# Patient Record
Sex: MALE | Race: WHITE | Employment: OTHER | URBAN - METROPOLITAN AREA
[De-identification: names, ages, dates, MRNs, and addresses within clinical notes are randomized per-mention and may not be internally consistent; named-entity substitution may affect disease eponyms.]

---

## 2021-10-15 ENCOUNTER — APPOINTMENT (OUTPATIENT)
Dept: MRI IMAGING | Age: 66
DRG: 645 | End: 2021-10-15
Attending: HOSPITALIST
Payer: MEDICARE

## 2021-10-15 ENCOUNTER — APPOINTMENT (OUTPATIENT)
Dept: MRI IMAGING | Age: 66
DRG: 645 | End: 2021-10-15
Attending: EMERGENCY MEDICINE
Payer: MEDICARE

## 2021-10-15 ENCOUNTER — APPOINTMENT (OUTPATIENT)
Dept: GENERAL RADIOLOGY | Age: 66
DRG: 645 | End: 2021-10-15
Attending: EMERGENCY MEDICINE
Payer: MEDICARE

## 2021-10-15 ENCOUNTER — APPOINTMENT (OUTPATIENT)
Dept: CT IMAGING | Age: 66
DRG: 645 | End: 2021-10-15
Attending: EMERGENCY MEDICINE
Payer: MEDICARE

## 2021-10-15 ENCOUNTER — HOSPITAL ENCOUNTER (INPATIENT)
Age: 66
LOS: 3 days | Discharge: NURSING, REHAB OR LONG-TERM CARE | DRG: 645 | End: 2021-10-18
Attending: EMERGENCY MEDICINE | Admitting: HOSPITALIST
Payer: MEDICARE

## 2021-10-15 DIAGNOSIS — M47.27 LUMBOSACRAL RADICULOPATHY DUE TO DEGENERATIVE JOINT DISEASE OF SPINE: ICD-10-CM

## 2021-10-15 DIAGNOSIS — R53.1 GENERALIZED WEAKNESS: ICD-10-CM

## 2021-10-15 DIAGNOSIS — E88.9 METABOLIC MYOPATHY: ICD-10-CM

## 2021-10-15 DIAGNOSIS — R53.1 WEAKNESS GENERALIZED: ICD-10-CM

## 2021-10-15 DIAGNOSIS — Z86.69 HISTORY OF TOXIC ENCEPHALOPATHY: ICD-10-CM

## 2021-10-15 DIAGNOSIS — G73.7 METABOLIC MYOPATHY: ICD-10-CM

## 2021-10-15 DIAGNOSIS — R53.1 WEAKNESS: ICD-10-CM

## 2021-10-15 DIAGNOSIS — R26.2 AMBULATORY DYSFUNCTION: Primary | ICD-10-CM

## 2021-10-15 DIAGNOSIS — R25.1 TREMORS OF NERVOUS SYSTEM: ICD-10-CM

## 2021-10-15 DIAGNOSIS — M47.22 CERVICAL RADICULOPATHY DUE TO DEGENERATIVE JOINT DISEASE OF SPINE: ICD-10-CM

## 2021-10-15 PROBLEM — E87.1 HYPONATREMIA: Status: ACTIVE | Noted: 2021-10-15

## 2021-10-15 LAB
ALBUMIN SERPL-MCNC: 3.8 G/DL (ref 3.5–5)
ALBUMIN/GLOB SERPL: 1 {RATIO} (ref 1.1–2.2)
ALP SERPL-CCNC: 49 U/L (ref 45–117)
ALT SERPL-CCNC: 32 U/L (ref 12–78)
AMMONIA PLAS-SCNC: 24 UMOL/L
AMPHET UR QL SCN: NEGATIVE
ANION GAP SERPL CALC-SCNC: 3 MMOL/L (ref 5–15)
APPEARANCE UR: CLEAR
AST SERPL-CCNC: 14 U/L (ref 15–37)
ATRIAL RATE: 78 BPM
BACTERIA URNS QL MICRO: NEGATIVE /HPF
BARBITURATES UR QL SCN: NEGATIVE
BASOPHILS # BLD: 0 K/UL (ref 0–0.1)
BASOPHILS NFR BLD: 0 % (ref 0–1)
BENZODIAZ UR QL: NEGATIVE
BILIRUB SERPL-MCNC: 0.7 MG/DL (ref 0.2–1)
BILIRUB UR QL: NEGATIVE
BUN SERPL-MCNC: 16 MG/DL (ref 6–20)
BUN/CREAT SERPL: 19 (ref 12–20)
CALCIUM SERPL-MCNC: 9.6 MG/DL (ref 8.5–10.1)
CALCULATED P AXIS, ECG09: 47 DEGREES
CALCULATED R AXIS, ECG10: 1 DEGREES
CALCULATED T AXIS, ECG11: 34 DEGREES
CANNABINOIDS UR QL SCN: NEGATIVE
CHLORIDE SERPL-SCNC: 100 MMOL/L (ref 97–108)
CK SERPL-CCNC: 78 U/L (ref 39–308)
CO2 SERPL-SCNC: 28 MMOL/L (ref 21–32)
COCAINE UR QL SCN: NEGATIVE
COLOR UR: NORMAL
COMMENT, HOLDF: NORMAL
CREAT SERPL-MCNC: 0.85 MG/DL (ref 0.7–1.3)
DIAGNOSIS, 93000: NORMAL
DIFFERENTIAL METHOD BLD: NORMAL
DRUG SCRN COMMENT,DRGCM: NORMAL
EOSINOPHIL # BLD: 0 K/UL (ref 0–0.4)
EOSINOPHIL NFR BLD: 1 % (ref 0–7)
EPITH CASTS URNS QL MICRO: NORMAL /LPF
ERYTHROCYTE [DISTWIDTH] IN BLOOD BY AUTOMATED COUNT: 13.9 % (ref 11.5–14.5)
ETHANOL SERPL-MCNC: <10 MG/DL
GLOBULIN SER CALC-MCNC: 4 G/DL (ref 2–4)
GLUCOSE SERPL-MCNC: 113 MG/DL (ref 65–100)
GLUCOSE UR STRIP.AUTO-MCNC: NEGATIVE MG/DL
HCT VFR BLD AUTO: 41 % (ref 36.6–50.3)
HGB BLD-MCNC: 14.3 G/DL (ref 12.1–17)
HGB UR QL STRIP: NEGATIVE
IMM GRANULOCYTES # BLD AUTO: 0 K/UL (ref 0–0.04)
IMM GRANULOCYTES NFR BLD AUTO: 0 % (ref 0–0.5)
KETONES UR QL STRIP.AUTO: NEGATIVE MG/DL
LEUKOCYTE ESTERASE UR QL STRIP.AUTO: NEGATIVE
LYMPHOCYTES # BLD: 1.5 K/UL (ref 0.8–3.5)
LYMPHOCYTES NFR BLD: 21 % (ref 12–49)
MAGNESIUM SERPL-MCNC: 2.5 MG/DL (ref 1.6–2.4)
MCH RBC QN AUTO: 30.2 PG (ref 26–34)
MCHC RBC AUTO-ENTMCNC: 34.9 G/DL (ref 30–36.5)
MCV RBC AUTO: 86.5 FL (ref 80–99)
METHADONE UR QL: NEGATIVE
MONOCYTES # BLD: 0.7 K/UL (ref 0–1)
MONOCYTES NFR BLD: 10 % (ref 5–13)
NEUTS SEG # BLD: 4.9 K/UL (ref 1.8–8)
NEUTS SEG NFR BLD: 68 % (ref 32–75)
NITRITE UR QL STRIP.AUTO: NEGATIVE
NRBC # BLD: 0 K/UL (ref 0–0.01)
NRBC BLD-RTO: 0 PER 100 WBC
OPIATES UR QL: NEGATIVE
OSMOLALITY SERPL: 275 MOSM/KG H2O
OSMOLALITY UR: 586 MOSM/KG H2O
P-R INTERVAL, ECG05: 138 MS
PCP UR QL: NEGATIVE
PH UR STRIP: 7.5 [PH] (ref 5–8)
PLATELET # BLD AUTO: 253 K/UL (ref 150–400)
PMV BLD AUTO: 11.4 FL (ref 8.9–12.9)
POTASSIUM SERPL-SCNC: 3.9 MMOL/L (ref 3.5–5.1)
PROT SERPL-MCNC: 7.8 G/DL (ref 6.4–8.2)
PROT UR STRIP-MCNC: NEGATIVE MG/DL
Q-T INTERVAL, ECG07: 378 MS
QRS DURATION, ECG06: 90 MS
QTC CALCULATION (BEZET), ECG08: 430 MS
RBC # BLD AUTO: 4.74 M/UL (ref 4.1–5.7)
RBC #/AREA URNS HPF: NORMAL /HPF (ref 0–5)
SAMPLES BEING HELD,HOLD: NORMAL
SODIUM SERPL-SCNC: 131 MMOL/L (ref 136–145)
SODIUM UR-SCNC: 144 MMOL/L
SP GR UR REFRACTOMETRY: 1.02 (ref 1–1.03)
TSH SERPL DL<=0.05 MIU/L-ACNC: 2.9 UIU/ML (ref 0.36–3.74)
UA: UC IF INDICATED,UAUC: NORMAL
UROBILINOGEN UR QL STRIP.AUTO: 0.2 EU/DL (ref 0.2–1)
VENTRICULAR RATE, ECG03: 78 BPM
WBC # BLD AUTO: 7.2 K/UL (ref 4.1–11.1)
WBC URNS QL MICRO: NORMAL /HPF (ref 0–4)

## 2021-10-15 PROCEDURE — 83935 ASSAY OF URINE OSMOLALITY: CPT

## 2021-10-15 PROCEDURE — 99285 EMERGENCY DEPT VISIT HI MDM: CPT

## 2021-10-15 PROCEDURE — 80307 DRUG TEST PRSMV CHEM ANLYZR: CPT

## 2021-10-15 PROCEDURE — 80053 COMPREHEN METABOLIC PANEL: CPT

## 2021-10-15 PROCEDURE — 82077 ASSAY SPEC XCP UR&BREATH IA: CPT

## 2021-10-15 PROCEDURE — 85025 COMPLETE CBC W/AUTO DIFF WBC: CPT

## 2021-10-15 PROCEDURE — 83735 ASSAY OF MAGNESIUM: CPT

## 2021-10-15 PROCEDURE — A9576 INJ PROHANCE MULTIPACK: HCPCS | Performed by: HOSPITALIST

## 2021-10-15 PROCEDURE — 84443 ASSAY THYROID STIM HORMONE: CPT

## 2021-10-15 PROCEDURE — 82550 ASSAY OF CK (CPK): CPT

## 2021-10-15 PROCEDURE — 82140 ASSAY OF AMMONIA: CPT

## 2021-10-15 PROCEDURE — 81001 URINALYSIS AUTO W/SCOPE: CPT

## 2021-10-15 PROCEDURE — 70450 CT HEAD/BRAIN W/O DYE: CPT

## 2021-10-15 PROCEDURE — 74011250637 HC RX REV CODE- 250/637: Performed by: HOSPITALIST

## 2021-10-15 PROCEDURE — 72141 MRI NECK SPINE W/O DYE: CPT

## 2021-10-15 PROCEDURE — 71045 X-RAY EXAM CHEST 1 VIEW: CPT

## 2021-10-15 PROCEDURE — 70553 MRI BRAIN STEM W/O & W/DYE: CPT

## 2021-10-15 PROCEDURE — 65660000000 HC RM CCU STEPDOWN

## 2021-10-15 PROCEDURE — 82607 VITAMIN B-12: CPT

## 2021-10-15 PROCEDURE — 84300 ASSAY OF URINE SODIUM: CPT

## 2021-10-15 PROCEDURE — 36415 COLL VENOUS BLD VENIPUNCTURE: CPT

## 2021-10-15 PROCEDURE — 74011250636 HC RX REV CODE- 250/636: Performed by: HOSPITALIST

## 2021-10-15 PROCEDURE — 83930 ASSAY OF BLOOD OSMOLALITY: CPT

## 2021-10-15 PROCEDURE — 72158 MRI LUMBAR SPINE W/O & W/DYE: CPT

## 2021-10-15 PROCEDURE — 93005 ELECTROCARDIOGRAM TRACING: CPT

## 2021-10-15 RX ORDER — ENOXAPARIN SODIUM 100 MG/ML
40 INJECTION SUBCUTANEOUS EVERY 24 HOURS
Status: DISCONTINUED | OUTPATIENT
Start: 2021-10-15 | End: 2021-10-18 | Stop reason: HOSPADM

## 2021-10-15 RX ORDER — ACETAMINOPHEN 650 MG/1
650 SUPPOSITORY RECTAL
Status: DISCONTINUED | OUTPATIENT
Start: 2021-10-15 | End: 2021-10-15

## 2021-10-15 RX ORDER — ACETAMINOPHEN 325 MG/1
650 TABLET ORAL
Status: DISCONTINUED | OUTPATIENT
Start: 2021-10-15 | End: 2021-10-18 | Stop reason: HOSPADM

## 2021-10-15 RX ORDER — ACETAMINOPHEN 325 MG/1
650 TABLET ORAL
Status: DISCONTINUED | OUTPATIENT
Start: 2021-10-15 | End: 2021-10-15

## 2021-10-15 RX ORDER — ACETAMINOPHEN 650 MG/1
650 SUPPOSITORY RECTAL
Status: DISCONTINUED | OUTPATIENT
Start: 2021-10-15 | End: 2021-10-18 | Stop reason: HOSPADM

## 2021-10-15 RX ORDER — LORAZEPAM 2 MG/ML
1 INJECTION INTRAMUSCULAR
Status: DISCONTINUED | OUTPATIENT
Start: 2021-10-15 | End: 2021-10-18 | Stop reason: HOSPADM

## 2021-10-15 RX ORDER — LORAZEPAM 2 MG/ML
1 INJECTION INTRAMUSCULAR ONCE
Status: COMPLETED | OUTPATIENT
Start: 2021-10-15 | End: 2021-10-15

## 2021-10-15 RX ADMIN — ACETAMINOPHEN 650 MG: 325 TABLET ORAL at 20:01

## 2021-10-15 RX ADMIN — GADOTERIDOL 13 ML: 279.3 INJECTION, SOLUTION INTRAVENOUS at 15:44

## 2021-10-15 RX ADMIN — LORAZEPAM 1 MG: 2 INJECTION INTRAMUSCULAR; INTRAVENOUS at 14:37

## 2021-10-15 RX ADMIN — ENOXAPARIN SODIUM 40 MG: 40 INJECTION SUBCUTANEOUS at 16:59

## 2021-10-15 NOTE — ED NOTES
Patient is being transferred to [facility] 3 Neuroscience ICU, Room # [phone]. Report given to [facility] RN on Cecil Dickerson for routine progression of care. Report consisted of the following information SBAR and MAR. Patient transferred to receiving unit by: transport (RN or tech name). Outstanding consults needed: No     Next labs due: No     The following personal items will be sent with the patient during transfer to the floor: All valuables:    Cardiac monitoring ordered: No     The following CURRENT information was reported to the receiving RN:    Code status: Full Code at time of transfer    Last set of vital signs:  Vital Signs  Level of Consciousness: Alert (0) (10/15/21 1315)  Temp: 98.7 °F (37.1 °C) (10/15/21 1315)  Temp Source: Oral (10/15/21 1315)  Pulse (Heart Rate): 83 (10/15/21 1315)  Resp Rate: 19 (10/15/21 1315)  BP: 112/66 (10/15/21 1315)  MAP (Monitor): 89 (10/15/21 1115)  MAP (Calculated): 81 (10/15/21 1315)  MEWS Score: 1 (10/15/21 1315)         Oxygen Therapy  O2 Sat (%): 97 % (10/15/21 1315)  Pulse via Oximetry: 85 beats per minute (10/15/21 1315)  O2 Device: None (Room air) (10/15/21 1315)      Last pain assessment:         Wounds: No     Urinary catheter: voiding  Is there a pena order: No     LDAs:       Peripheral IV 10/15/21 Right Antecubital (Active)   Site Assessment Clean, dry, & intact 10/15/21 1519   Phlebitis Assessment 0 10/15/21 1519   Infiltration Assessment 0 10/15/21 1519   Dressing Status Clean, dry, & intact 10/15/21 1519   Hub Color/Line Status Pink 10/15/21 1519         Opportunity for questions and clarification was provided.     Ariana Armstrong RN

## 2021-10-15 NOTE — PROGRESS NOTES
-Please complete MRI History and Safety Screening Form   - Patient cannot be scanned until this form is completed and reviewed in MRI to ensure patient is SAFE and eligible for MRI. - CALL MRI when this has been successfully completed at [phone].

## 2021-10-15 NOTE — PROGRESS NOTES
Pharmacy Medication Reconciliation     The patient was interviewed regarding current PTA medication list, use and drug allergies;  patient/patient's wife present in room and obtained permission from patient to discuss drug regimen with visitor(s) present. The patient was questioned regarding use of any other inhalers, topical products, over the counter medications, herbal medications, vitamin products or ophthalmic/nasal/otic medication use. Allergy Update: Patient has no known allergies. Recommendations/Findings: The following amendments were made to the patient's active medication list on file at [address]:   1) Additions: none  2) Deletions: none  3) Changes: none  Pertinent Findings: none    Clarified PTA med list with patient/patient's wife interivew, rx query. PTA medication list was corrected to the following:     Confirmed no home meds.    None        Thank you,  Avie Councilman, [facility]

## 2021-10-15 NOTE — PROGRESS NOTES
End of Shift Note    Bedside shift change report given to Jessica [address]  (oncoming nurse) by MAXIMINO Brothers (offgoing nurse). Report included the following information SBAR, Kardex, ED Summary, Procedure Summary, Intake/Output, MAR, Accordion, Recent Results and Med Rec Status    Shift worked:  7a-7p   Shift summary and any significant changes:     admission, no acute events to report        Concerns for physician to address: none   Zone phone for oncoming shift:   none     Patient Information  Krystyna Serrano  61 y.o.  10/15/2021  6:29 AM by Mikell Dakins, MD. Krystyna Serrano was admitted from Home    Problem List  Patient Active Problem List    Diagnosis Date Noted    Hyponatremia 10/15/2021    Generalized weakness 10/15/2021    Unable to walk 10/15/2021     No past medical history on file. Core Measures:  CVA: No Yes  CHF:No No  PNA:No No    Activity:  Activity Level: Up with Assistance  Number times ambulated in hallways past shift: 1  Number of times OOB to chair past shift: 0    Cardiac:   Cardiac Monitoring: Yes           Access:   Current line(s): PIV       Genitourinary:   Urinary status: voiding      Respiratory:   O2 Device: None (Room air)  Chronic home O2 use?: NO  Incentive spirometer at bedside: NO       GI:     Current diet:  ADULT DIET Regular  Passing flatus: YES  Tolerating current diet: YES       Pain Management:   Patient states pain is manageable on current regimen: YES    Skin:  Jonathan Score: 18  Interventions: increase time out of bed    Patient Safety:  Fall Score:  Total Score: 0  Interventions: bed/chair alarm, assistive device (walker, cane, etc), gripper socks, pt to call before getting OOB and stay with me (per policy)     @Rollbelt  @dexterity to release roll belt  Yes/No ( must document dexterity  here by stating Yes or No here, otherwise this is a restraint and must follow restraint documentation policy.)    DVT prophylaxis:  DVT prophylaxis Med- Yes  DVT prophylaxis SCD or YISSEL- No Wounds: (If Applicable)  Wounds- No  Location     Active Consults:  IP CONSULT TO NEUROLOGY    Length of Stay:  Expected LOS: - - -  Actual LOS: 0  Discharge Plan: No TBMAXIMINO Loomis

## 2021-10-15 NOTE — ED NOTES
Verbal shift change report given to Jay Rai Lady (oncoming nurse) by Anoop Dunn (offgoing nurse). Report included the following information SBAR, ED Summary, Intake/Output and MAR.

## 2021-10-15 NOTE — ED PROVIDER NOTES
EMERGENCY DEPARTMENT HISTORY AND PHYSICAL EXAM      Date: 10/15/2021  Patient Name: Gennaro Cooley    History of Presenting Illness     Chief complaint: Weakness    History Provided By: Patient and Patient's Wife    HPI: Gennaro Cooley, 61 y.o. male without significant medical history presents to the ED with cc of tremors, generalized weakness, fatigue. Symptoms have been progressive worsening over the past few months. This past evening he had tremors throughout all extremities which progressed into a convulsive episode around 3 AM.  He was awake and alert throughout this entire episode and did not have any loss of consciousness or urinary incontinence. No prior history of seizures. Endorses worsening generalized weakness, fatigue, ambulatory dysfunction over the past few months but significantly worsened over the past few weeks and especially over the past few days he has no longer been able to care for himself or ambulate or take steps. He feels weak in all of his extremities without any focal deficits. Denies fevers, chills, or recent illness. He does not see a PCP or a neurologist.  He recently moved to the area from [facility] about a year and a half ago and endorses significant stress related to the move. Denies any prior medical history and does not take any medications. Denies any alcohol, drug use. Denies any depression or anxiety. Denies any injuries, fall, trauma. He has never seen a neurologist before. He believes that all of his symptoms may be related to a chemical exposure about 30 years ago to mercury while working at an old [employer] worksite. There are no other complaints, changes, or physical findings at this time. PCP: None    No current facility-administered medications on file prior to encounter. No current outpatient medications on file prior to encounter. Past History     Past Medical History:  No past medical history on file.     Past Surgical History:  No past surgical history on file. Family History:  Family History   Problem Relation Age of Onset   Zannie Cowden Arthritis-rheumatoid Father     Diabetes Brother        Social History:  Social History     Tobacco Use    Smoking status: Not on file   Substance Use Topics    Alcohol use: Not on file    Drug use: Not on file       Allergies:  No Known Allergies      Review of Systems   Review of Systems   Constitutional: Positive for activity change and fatigue. Negative for chills and fever. Eyes: Negative for visual disturbance. Respiratory: Negative for cough and shortness of breath. Cardiovascular: Negative for chest pain and leg swelling. Gastrointestinal: Negative for abdominal pain, nausea and vomiting. Musculoskeletal: Positive for gait problem. Negative for back pain. Skin: Negative for color change and rash. Neurological: Positive for weakness ( Generalized). Negative for dizziness, light-headedness and headaches. Hematological: Does not bruise/bleed easily. All other systems reviewed and are negative. Physical Exam   Physical Exam  Vitals and nursing note reviewed. Constitutional:       General: He is not in acute distress. Appearance: Normal appearance. He is not ill-appearing or toxic-appearing. Comments: Lying in bed in no acute distress, appears deconditioned. HENT:      Head: Normocephalic and atraumatic. Nose: Nose normal.      Mouth/Throat:      Mouth: Mucous membranes are moist.   Eyes:      Extraocular Movements: Extraocular movements intact. Pupils: Pupils are equal, round, and reactive to light. Cardiovascular:      Rate and Rhythm: Normal rate and regular rhythm. Heart sounds: No murmur heard. Pulmonary:      Effort: Pulmonary effort is normal. No respiratory distress. Breath sounds: Normal breath sounds. No wheezing. Abdominal:      General: There is no distension. Palpations: Abdomen is soft. Tenderness:  There is no abdominal tenderness. There is no guarding or rebound. Musculoskeletal:         General: No swelling or tenderness. Normal range of motion. Cervical back: Normal range of motion and neck supple. Right lower leg: No edema. Left lower leg: No edema. Comments: Muscle groups appear atrophied in all extremities. No injury, trauma, deformity. Skin:     General: Skin is warm and dry. Coloration: Skin is not pale. Findings: No erythema. Neurological:      General: No focal deficit present. Mental Status: He is alert and oriented to person, place, and time. Comments: Patient appears globally weak with 3/5 strength all extremities, sensation intact. No cerebellar deficits but he does have significant trembling in upper extremities. Very unsteady gait and unable to ambulate without assistance. No focal deficits. NIHSS 0. Significant hyperreflexia bilateral patellar DTRs         Diagnostic Study Results     Labs -     Recent Results (from the past 12 hour(s))   CBC WITH AUTOMATED DIFF    Collection Time: 10/15/21  7:28 AM   Result Value Ref Range    WBC 7.2 4.1 - 11.1 K/uL    RBC 4.74 4.10 - 5.70 M/uL    HGB 14.3 12.1 - 17.0 g/dL    HCT 41.0 36.6 - 50.3 %    MCV 86.5 80.0 - 99.0 FL    MCH 30.2 26.0 - 34.0 PG    MCHC 34.9 30.0 - 36.5 g/dL    RDW 13.9 11.5 - 14.5 %    PLATELET 622 299 - 126 K/uL    MPV 11.4 8.9 - 12.9 FL    NRBC 0.0 0  WBC    ABSOLUTE NRBC 0.00 0.00 - 0.01 K/uL    NEUTROPHILS 68 32 - 75 %    LYMPHOCYTES 21 12 - 49 %    MONOCYTES 10 5 - 13 %    EOSINOPHILS 1 0 - 7 %    BASOPHILS 0 0 - 1 %    IMMATURE GRANULOCYTES 0 0.0 - 0.5 %    ABS. NEUTROPHILS 4.9 1.8 - 8.0 K/UL    ABS. LYMPHOCYTES 1.5 0.8 - 3.5 K/UL    ABS. MONOCYTES 0.7 0.0 - 1.0 K/UL    ABS. EOSINOPHILS 0.0 0.0 - 0.4 K/UL    ABS. BASOPHILS 0.0 0.0 - 0.1 K/UL    ABS. IMM.  GRANS. 0.0 0.00 - 0.04 K/UL    DF AUTOMATED     METABOLIC PANEL, COMPREHENSIVE    Collection Time: 10/15/21  7:28 AM   Result Value Ref Range Sodium 131 (L) 136 - 145 mmol/L    Potassium 3.9 3.5 - 5.1 mmol/L    Chloride 100 97 - 108 mmol/L    CO2 28 21 - 32 mmol/L    Anion gap 3 (L) 5 - 15 mmol/L    Glucose 113 (H) 65 - 100 mg/dL    BUN 16 6 - 20 MG/DL    Creatinine 0.85 0.70 - 1.30 MG/DL    BUN/Creatinine ratio 19 12 - 20      GFR est AA >60 >60 ml/min/1.73m2    GFR est non-AA >60 >60 ml/min/1.73m2    Calcium 9.6 8.5 - 10.1 MG/DL    Bilirubin, total 0.7 0.2 - 1.0 MG/DL    ALT (SGPT) 32 12 - 78 U/L    AST (SGOT) 14 (L) 15 - 37 U/L    Alk. phosphatase 49 45 - 117 U/L    Protein, total 7.8 6.4 - 8.2 g/dL    Albumin 3.8 3.5 - 5.0 g/dL    Globulin 4.0 2.0 - 4.0 g/dL    A-G Ratio 1.0 (L) 1.1 - 2.2     SAMPLES BEING HELD    Collection Time: 10/15/21  7:28 AM   Result Value Ref Range    SAMPLES BEING HELD  2 RED, SST, BLUE     COMMENT        Add-on orders for these samples will be processed based on acceptable specimen integrity and analyte stability, which may vary by analyte.    ETHYL ALCOHOL    Collection Time: 10/15/21  7:28 AM   Result Value Ref Range    ALCOHOL(ETHYL),SERUM <10 <10 MG/DL   MAGNESIUM    Collection Time: 10/15/21  7:28 AM   Result Value Ref Range    Magnesium 2.5 (H) 1.6 - 2.4 mg/dL   TSH 3RD GENERATION    Collection Time: 10/15/21  7:28 AM   Result Value Ref Range    TSH 2.90 0.36 - 3.74 uIU/mL   EKG, 12 LEAD, INITIAL    Collection Time: 10/15/21  8:07 AM   Result Value Ref Range    Ventricular Rate 78 BPM    Atrial Rate 78 BPM    P-R Interval 138 ms    QRS Duration 90 ms    Q-T Interval 378 ms    QTC Calculation (Bezet) 430 ms    Calculated P Axis 47 degrees    Calculated R Axis 1 degrees    Calculated T Axis 34 degrees    Diagnosis       Normal sinus rhythm  Normal ECG  No previous ECGs available  Confirmed by Lucy Nunes ([id number]) on 10/15/2021 8:53:45 AM     DRUG SCREEN, URINE    Collection Time: 10/15/21  8:15 AM   Result Value Ref Range    AMPHETAMINES Negative NEG      BARBITURATES Negative NEG      BENZODIAZEPINES Negative NEG COCAINE Negative NEG      METHADONE Negative NEG      OPIATES Negative NEG      PCP(PHENCYCLIDINE) Negative NEG      THC (TH-CANNABINOL) Negative NEG      Drug screen comment (NOTE)    URINALYSIS W/ REFLEX CULTURE    Collection Time: 10/15/21  8:15 AM    Specimen: Urine   Result Value Ref Range    Color YELLOW/STRAW      Appearance CLEAR CLEAR      Specific gravity 1.020 1.003 - 1.030      pH (UA) 7.5 5.0 - 8.0      Protein Negative NEG mg/dL    Glucose Negative NEG mg/dL    Ketone Negative NEG mg/dL    Bilirubin Negative NEG      Blood Negative NEG      Urobilinogen 0.2 0.2 - 1.0 EU/dL    Nitrites Negative NEG      Leukocyte Esterase Negative NEG      WBC 0-4 0 - 4 /hpf    RBC 0-5 0 - 5 /hpf    Epithelial cells FEW FEW /lpf    Bacteria Negative NEG /hpf    UA:UC IF INDICATED CULTURE NOT INDICATED BY UA RESULT CNI     AMMONIA    Collection Time: 10/15/21  2:35 PM   Result Value Ref Range    Ammonia 24 <32 UMOL/L   CK    Collection Time: 10/15/21  2:35 PM   Result Value Ref Range    CK 78 39 - 308 U/L       Radiologic Studies -   XR CHEST PORT   Final Result   Normal chest.      CT HEAD WO CONT   Final Result   No acute abnormality. MRI BRAIN W WO CONT    (Results Pending)   MRI CERV SPINE WO CONT    (Results Pending)   MRI LUMB SPINE W WO CONT    (Results Pending)     CT Results  (Last 48 hours)               10/15/21 0817  CT HEAD WO CONT Final result    Impression:  No acute abnormality. Narrative:  EXAM: CT HEAD WO CONT       INDICATION: weakness, fatigue, seizure like activity? COMPARISON: None. CONTRAST: None. TECHNIQUE: Unenhanced CT of the head was performed using 5 mm images. Brain and   bone windows were generated. Coronal and sagittal reformats. CT dose reduction   was achieved through use of a standardized protocol tailored for this   examination and automatic exposure control for dose modulation.          FINDINGS:   The ventricles and sulci are normal in size, shape and configuration. . There is   no significant white matter disease. There is no intracranial hemorrhage,   extra-axial collection, or mass effect. The basilar cisterns are open. No CT   evidence of acute infarct. The bone windows demonstrate no abnormalities. The visualized portions of the   paranasal sinuses and mastoid air cells are clear. CXR Results  (Last 48 hours)               10/15/21 0854  XR CHEST PORT Final result    Impression:  Normal chest.       Narrative:  EXAM: XR CHEST PORT       INDICATION: weakness, fatigue       COMPARISON: None. FINDINGS: A portable AP radiograph of the chest was obtained at 0852 hours. The   patient is on a cardiac monitor. The lungs are clear. The cardiac and   mediastinal contours and pulmonary vascularity are normal.  The bones and soft   tissues are grossly within normal limits. Medical Decision Making   I am the first provider for this patient. I reviewed the vital signs, available nursing notes, past medical history, past surgical history, family history and social history. Vital Signs-Reviewed the patient's vital signs. Patient Vitals for the past 12 hrs:   Temp Pulse Resp BP SpO2   10/15/21 1620 98.6 °F (37 °C) 80 16 112/65 96 %   10/15/21 1315 98.7 °F (37.1 °C) 83 19 112/66 97 %   10/15/21 1115 98.8 °F (37.1 °C) 72 19 126/75 96 %   10/15/21 1030 98.7 °F (37.1 °C) 73 17 122/72 94 %   10/15/21 1000  78 16 122/74 96 %   10/15/21 0745 98.5 °F (36.9 °C) 81 18 135/76 96 %   10/15/21 0700  84 19 133/72 97 %   10/15/21 0645  84 19 134/77 97 %   10/15/21 0630 98.4 °F (36.9 °C) 95 15 (!) 148/82 98 %       Records Reviewed: Nursing Notes      Provider Notes (Medical Decision Making):   61-year-old male without significant medical history here with generalized weakness, fatigue, ambulatory dysfunction. He is afebrile and vital signs stable. Neuro exam as above without any focal neurologic deficits.   He does have significant weakness globally and does have tremors throughout all extremities. He is unable to ambulate without assistance. This is a new process for him. Unclear etiology, consider toxic exposure, drugs, alcohol, spinal cord etiology, CNS involvement, CVA, MS, electrolyte abnormality, dehydration, upper motor neuron degeneration. Evaluate patient with blood work, chest x-ray, blood alcohol level, UDS, TSH, CT head, and reassess. Patient's work-up largely unremarkable. I discussed with Dr. Broderick Silva, who has evaluated patient. 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 He recommends obtaining MRI brain with and without contrast to rule out CVA or other acute process but otherwise this can be followed up as an outpatient in neuro clinic. Patient is very uncomfortable with thought of going home. Currently cannot care for self and unable to ambulate throughout his home without assistance. Wife is unable to care for him. They are requesting admission for possible placement. I feel patient will benefit from PT, OT, case management evaluation for further resources and possible placement. Will obtain MRI brain and discuss with hospitalist for admission. ED Course:   Initial assessment performed. The patients presenting problems have been discussed, and they are in agreement with the care plan formulated and outlined with them. I have encouraged them to ask questions as they arise throughout their visit. ED Course as of Oct 15 1627   Fri Oct 15, 2021   8358 EKG per my interpretation normal sinus rhythm, rate 78 bpm, normal axis, no acute ischemic changes, no interval changes. [AK]      ED Course User Index  [AK] Luiz Ugalde MD         Cardiac Monitoring:   The cardiac monitor revealed the following rhythm as interpreted by me: Normal Sinus Rhythm, rate 65 bpm  The cardiac monitor was ordered secondary to the patient's reported complaint of weakness and to monitor the patient for dysrhythmia. Ritu Araya MD      Admission Note:  Patient is being admitted to the hospital by Dr. Miley Neves, Service: Hospitalist.  The results of their tests and reasons for their admission have been discussed with them and available family. They convey agreement and understanding for the need to be admitted and for their admission diagnosis. Disposition:  Admit      Diagnosis     Clinical Impression:   1. Ambulatory dysfunction    2. Weakness        Attestations:  I am the first and primary provider of record for this patient's ED encounter. I personally performed the services described above in this documentation. Ritu Araya MD    Please note that this dictation was completed with [employer], the computer voice recognition software. Quite often unanticipated grammatical, syntax, homophones, and other interpretive errors are inadvertently transcribed by the computer software. Please disregard these errors. Please excuse any errors that have escaped final proofreading. Thank you.

## 2021-10-15 NOTE — H&P
Hospitalist Admission Note    NAME: Jasmyn Leblanc   :  1955   MRN:  [id number]     Date/Time:  10/15/2021 1:57 PM    Patient PCP: None    Please note that this dictation was completed with eoSemi, the computer voice recognition software. Quite often unanticipated grammatical, syntax, homophones, and other interpretive errors are inadvertently transcribed by the computer software. Please disregard these errors. 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 --Evaluated by telemetry neurologist in the ER and has NIH stroke score equal to (limb ataxia and mild to moderate dysarthria). There is concern for ischemia or demyelination or potentially neurodegenerative neuromuscular disease. --MRI of the brain with and without contrast recommended by telemetry neurology. Also added MRI of the cervical and lumbar spine as patient is reporting low back pain and had seen a chiropractor yesterday and had gentle manipulation done. --Check ammonia, CK, B12.  TSH, UA, CXR normal  --consult in house neurologist, pt/ot. Fall precaution    Hyponatremia Na 131  --Check urine sodium, urine osmolality, serum osmolality  --check orthostatic  ---denies wt loss, n/v/diarrhea      Body mass index is 25.61 kg/m². Code: full  DVT prophylaxis: lovenox  Surrogate decision maker:  Wife Jana Queen          Subjective:   CHIEF COMPLAINT:  Tremors, generalized weakness, unable to walk    HISTORY OF PRESENT ILLNESS:     Jasmyn Leblanc is a 61 y.o. male presents to ER with above symptoms.   He and his wife moved from [facility] [address] to the [facility] in August 2020.  He lives in a two-story home and baseline ambulate independently. He reports disability due to shoulder developing chemical exposure that led him to have tremors, severe generalized weakness and profound weight loss down to 90 pounds in the late 1990s. 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 After this he gradually recovered and had been doing well up until around June 2021 when he started having some fatigue and generalized weakness. He has gained about 10 to 15 pounds despite diet change and exercise. At 3 AM this morning he had violent shaking in his limbs head with jaw clenching happened twice. He denies any urinary or bladder incontinence or tongue biting. In the last 2-3 days his generalized weakness has worsened and he is having difficulty walking today. He saw a chiropractor yesterday who did very gentle, massage like manipulation on his spine but he did not feel that his back was the problem of his weakness. Patient developed low back pain after bending down to put on his socks about a week ago. Dizziness when getting out of bed recently. We were asked to admit for work up and evaluation of the above problems. No past medical history on file. No past surgical history on file. Social History     Tobacco Use    Smoking status: Not on file   Substance Use Topics    Alcohol use: Not on file      Drug use:        Family History   Problem Relation Age of Onset   [employer] Arthritis-rheumatoid Father    [employer] Diabetes Brother    Grandfather with stroke  Cousin -- lupus    No Known Allergies     Prior to Admission medications    Not on File     REVIEW OF SYSTEMS:  POSITIVE= Bold.   Negative = normal text  General:  fever, chills, sweats, generalized weakness, weight gain, loss of appetite  Eyes:  blurred vision, eye pain, loss of vision, diplopia  Ear Nose and Throat:  rhinorrhea, pharyngitis  Respiratory:   cough, sputum production, SOB, wheezing, DOMINGUEZ, pleuritic pain  Cardiology:  chest pain, palpitations, orthopnea, PND, edema, syncope   Gastrointestinal:  abdominal pain, N/V, dysphagia, diarrhea, chronic constipation, bleeding  Genitourinary:  frequency, urgency, dysuria, hematuria, incontinence  Muskuloskeletal :  Arthralgia--low back, myalgia  Hematology:  easy bruising, bleeding, lymphadenopathy  Dermatological:  rash, ulceration, pruritis  Endocrine:  hot flashes or polydipsia  Neurological:  headache, dizziness, confusion, focal weakness, paresthesia, memory loss, gait disturbance  Psychological: anxiety, depression, agitation      Objective:   VITALS:    Visit Vitals  /75   Pulse 72   Temp 98.8 °F (37.1 °C)   Resp 19   Ht 5' 2\" (1.575 m)   Wt 63.5 kg (140 lb)   SpO2 96%   BMI 25.61 kg/m²     Temp (24hrs), Av.6 °F (37 °C), Min:98.4 °F (36.9 °C), Max:98.8 °F (37.1 °C)    Body mass index is 25.61 kg/m². PHYSICAL EXAM:    General:    Alert, cooperative, no distress, appears stated age. Weak voice, laying flat on stretcher  HEENT: Atraumatic, anicteric sclerae, pink conjunctivae     No oral ulcers, mucosa moist, throat clear. Hearing intact. Neck:  Supple, symmetrical,  thyroid: non tender. No lymphadenopathy  Lungs:   Clear to auscultation bilaterally. No Wheezing or Rhonchi. No rales. Chest wall:  No tenderness  No Accessory muscle use. Heart:   Regular  rhythm,  No  murmur   No gallop. No edema. Abdomen:   Soft, non-tender. Not distended. Bowel sounds normal. No masses  Extremities: No cyanosis. No clubbing  Skin:     Not pale Not Jaundiced  No rashes   Psych:  Good insight. Not depressed. Not anxious or agitated. Neurologic: EOMs intact. No facial asymmetry. No aphasia or slurred speech; voice weak and sometimes muffled. Symmetrical strength arms 4/5, legs 3/5. With sustained resistance against gravity, his arms and legs begin to shake , Alert and oriented X 3.    Peripheral pulse: Bilateral, DP, 2+  Capillary refill:  normal    IMAGING RESULTS:   []       I have personally reviewed the actual   []     CXR  []     CT scan  CXR:  CT :  EKG:   ________________________________________________________________________  Care Plan discussed with:    Comments   Patient y    Family  y wife   RN     Care Manager                    Consultant:      ________________________________________________________________________  Prophylaxis:  GI none   DVT lovenox   ________________________________________________________________________  Recommended Disposition:   Home with Family    HH/PT/OT/RN    SNF/LTC y   NANCY    ________________________________________________________________________  Code Status:  Full Code y   DNR/DNI    ________________________________________________________________________  TOTAL TIME:  45 minutes    ______________________________________________________________________  Jareth Clay MD      Procedures: see electronic medical records for all procedures/Xrays and details which were not copied into this note but were reviewed prior to creation of Plan. LAB DATA REVIEWED:    Recent Results (from the past 24 hour(s))   CBC WITH AUTOMATED DIFF    Collection Time: 10/15/21  7:28 AM   Result Value Ref Range    WBC 7.2 4.1 - 11.1 K/uL    RBC 4.74 4.10 - 5.70 M/uL    HGB 14.3 12.1 - 17.0 g/dL    HCT 41.0 36.6 - 50.3 %    MCV 86.5 80.0 - 99.0 FL    MCH 30.2 26.0 - 34.0 PG    MCHC 34.9 30.0 - 36.5 g/dL    RDW 13.9 11.5 - 14.5 %    PLATELET 708 985 - 178 K/uL    MPV 11.4 8.9 - 12.9 FL    NRBC 0.0 0  WBC    ABSOLUTE NRBC 0.00 0.00 - 0.01 K/uL    NEUTROPHILS 68 32 - 75 %    LYMPHOCYTES 21 12 - 49 %    MONOCYTES 10 5 - 13 %    EOSINOPHILS 1 0 - 7 %    BASOPHILS 0 0 - 1 %    IMMATURE GRANULOCYTES 0 0.0 - 0.5 %    ABS. NEUTROPHILS 4.9 1.8 - 8.0 K/UL    ABS. LYMPHOCYTES 1.5 0.8 - 3.5 K/UL    ABS. MONOCYTES 0.7 0.0 - 1.0 K/UL    ABS. EOSINOPHILS 0.0 0.0 - 0.4 K/UL    ABS. BASOPHILS 0.0 0.0 - 0.1 K/UL    ABS. IMM.  GRANS. 0.0 0.00 - 0.04 K/UL DF AUTOMATED     METABOLIC PANEL, COMPREHENSIVE    Collection Time: 10/15/21  7:28 AM   Result Value Ref Range    Sodium 131 (L) 136 - 145 mmol/L    Potassium 3.9 3.5 - 5.1 mmol/L    Chloride 100 97 - 108 mmol/L    CO2 28 21 - 32 mmol/L    Anion gap 3 (L) 5 - 15 mmol/L    Glucose 113 (H) 65 - 100 mg/dL    BUN 16 6 - 20 MG/DL    Creatinine 0.85 0.70 - 1.30 MG/DL    BUN/Creatinine ratio 19 12 - 20      GFR est AA >60 >60 ml/min/1.73m2    GFR est non-AA >60 >60 ml/min/1.73m2    Calcium 9.6 8.5 - 10.1 MG/DL    Bilirubin, total 0.7 0.2 - 1.0 MG/DL    ALT (SGPT) 32 12 - 78 U/L    AST (SGOT) 14 (L) 15 - 37 U/L    Alk. phosphatase 49 45 - 117 U/L    Protein, total 7.8 6.4 - 8.2 g/dL    Albumin 3.8 3.5 - 5.0 g/dL    Globulin 4.0 2.0 - 4.0 g/dL    A-G Ratio 1.0 (L) 1.1 - 2.2     SAMPLES BEING HELD    Collection Time: 10/15/21  7:28 AM   Result Value Ref Range    SAMPLES BEING HELD  2 RED, SST, BLUE     COMMENT        Add-on orders for these samples will be processed based on acceptable specimen integrity and analyte stability, which may vary by analyte.    ETHYL ALCOHOL    Collection Time: 10/15/21  7:28 AM   Result Value Ref Range    ALCOHOL(ETHYL),SERUM <10 <10 MG/DL   MAGNESIUM    Collection Time: 10/15/21  7:28 AM   Result Value Ref Range    Magnesium 2.5 (H) 1.6 - 2.4 mg/dL   TSH 3RD GENERATION    Collection Time: 10/15/21  7:28 AM   Result Value Ref Range    TSH 2.90 0.36 - 3.74 uIU/mL   EKG, 12 LEAD, INITIAL    Collection Time: 10/15/21  8:07 AM   Result Value Ref Range    Ventricular Rate 78 BPM    Atrial Rate 78 BPM    P-R Interval 138 ms    QRS Duration 90 ms    Q-T Interval 378 ms    QTC Calculation (Bezet) 430 ms    Calculated P Axis 47 degrees    Calculated R Axis 1 degrees    Calculated T Axis 34 degrees    Diagnosis       Normal sinus rhythm  Normal ECG  No previous ECGs available  Confirmed by Hilda Stinson ([id number]) on 10/15/2021 8:53:45 AM     DRUG SCREEN, URINE    Collection Time: 10/15/21  8:15 AM   Result Value Ref Range    AMPHETAMINES Negative NEG      BARBITURATES Negative NEG      BENZODIAZEPINES Negative NEG      COCAINE Negative NEG      METHADONE Negative NEG      OPIATES Negative NEG      PCP(PHENCYCLIDINE) Negative NEG      THC (TH-CANNABINOL) Negative NEG      Drug screen comment (NOTE)    URINALYSIS W/ REFLEX CULTURE    Collection Time: 10/15/21  8:15 AM    Specimen: Urine   Result Value Ref Range    Color YELLOW/STRAW      Appearance CLEAR CLEAR      Specific gravity 1.020 1.003 - 1.030      pH (UA) 7.5 5.0 - 8.0      Protein Negative NEG mg/dL    Glucose Negative NEG mg/dL    Ketone Negative NEG mg/dL    Bilirubin Negative NEG      Blood Negative NEG      Urobilinogen 0.2 0.2 - 1.0 EU/dL    Nitrites Negative NEG      Leukocyte Esterase Negative NEG      WBC 0-4 0 - 4 /hpf    RBC 0-5 0 - 5 /hpf    Epithelial cells FEW FEW /lpf    Bacteria Negative NEG /hpf    UA:UC IF INDICATED CULTURE NOT INDICATED BY UA RESULT CNI

## 2021-10-15 NOTE — PROGRESS NOTES
Speech Pathology Note    Orders received and chart reviewed. Spoke with RN. Patient currently NICOLE for MRI. Of note, patient passed the STAND and a regular/thin liquid diet was ordered. RN reported patient with concerns re: motor speech. Will defer and continue to follow. Thank you.     Heidi Kendrick M.S., CF-SLP

## 2021-10-15 NOTE — ED NOTES
Pt to room by EMS from home. PT here today with complaints of possible seizure like activity. Pt states that 30 years ago he had a photochemical exposure that left him weak and with muscle tremors. Pt states that since June these symptoms have gotten worse but his doctors have told him there nothing wrong. Pt states that tonight around 2AM he started having involuntary shaking of his whole body. PT states that he did not have LOC and remembers the whole thing but believes it to be a seizure since this has never happened before. PT denies chest pain, sob, abd pain, n/v/d, loss of bowel/bladder, LOC, hitting head. Pt ANOX4, respirations even and unlabored, skin warm dry and intact, NAD noted.

## 2021-10-16 PROBLEM — G73.7 METABOLIC MYOPATHY: Status: ACTIVE | Noted: 2021-10-16

## 2021-10-16 PROBLEM — M47.22 CERVICAL RADICULOPATHY DUE TO DEGENERATIVE JOINT DISEASE OF SPINE: Status: ACTIVE | Noted: 2021-10-16

## 2021-10-16 PROBLEM — F32.A DEPRESSION: Status: ACTIVE | Noted: 2021-10-16

## 2021-10-16 PROBLEM — R25.1 TREMORS OF NERVOUS SYSTEM: Status: ACTIVE | Noted: 2021-10-16

## 2021-10-16 PROBLEM — E88.9 METABOLIC MYOPATHY: Status: ACTIVE | Noted: 2021-10-16

## 2021-10-16 PROBLEM — M47.27 LUMBOSACRAL RADICULOPATHY DUE TO DEGENERATIVE JOINT DISEASE OF SPINE: Status: ACTIVE | Noted: 2021-10-16

## 2021-10-16 PROBLEM — F51.04 CHRONIC INSOMNIA: Status: ACTIVE | Noted: 2021-10-16

## 2021-10-16 LAB
25(OH)D3 SERPL-MCNC: 18.5 NG/ML (ref 30–100)
ANION GAP SERPL CALC-SCNC: 5 MMOL/L (ref 5–15)
BUN SERPL-MCNC: 14 MG/DL (ref 6–20)
BUN/CREAT SERPL: 22 (ref 12–20)
CALCIUM SERPL-MCNC: 9.1 MG/DL (ref 8.5–10.1)
CHLORIDE SERPL-SCNC: 96 MMOL/L (ref 97–108)
CHOLEST SERPL-MCNC: 157 MG/DL
CO2 SERPL-SCNC: 24 MMOL/L (ref 21–32)
CREAT SERPL-MCNC: 0.63 MG/DL (ref 0.7–1.3)
ERYTHROCYTE [SEDIMENTATION RATE] IN BLOOD: 8 MM/HR (ref 0–20)
EST. AVERAGE GLUCOSE BLD GHB EST-MCNC: 105 MG/DL
FOLATE SERPL-MCNC: 31.4 NG/ML (ref 5–21)
GLUCOSE SERPL-MCNC: 105 MG/DL (ref 65–100)
HBA1C MFR BLD: 5.3 % (ref 4–5.6)
HDLC SERPL-MCNC: 46 MG/DL
HDLC SERPL: 3.4 {RATIO} (ref 0–5)
LDLC SERPL CALC-MCNC: 85.2 MG/DL (ref 0–100)
POTASSIUM SERPL-SCNC: 4.1 MMOL/L (ref 3.5–5.1)
SODIUM SERPL-SCNC: 125 MMOL/L (ref 136–145)
TRIGL SERPL-MCNC: 129 MG/DL (ref ?–150)
VIT B12 SERPL-MCNC: >2000 PG/ML (ref 193–986)
VLDLC SERPL CALC-MCNC: 25.8 MG/DL

## 2021-10-16 PROCEDURE — 74011250637 HC RX REV CODE- 250/637: Performed by: INTERNAL MEDICINE

## 2021-10-16 PROCEDURE — 83036 HEMOGLOBIN GLYCOSYLATED A1C: CPT

## 2021-10-16 PROCEDURE — 97535 SELF CARE MNGMENT TRAINING: CPT

## 2021-10-16 PROCEDURE — 82306 VITAMIN D 25 HYDROXY: CPT

## 2021-10-16 PROCEDURE — 65660000000 HC RM CCU STEPDOWN

## 2021-10-16 PROCEDURE — 74011250637 HC RX REV CODE- 250/637: Performed by: PSYCHIATRY & NEUROLOGY

## 2021-10-16 PROCEDURE — 36415 COLL VENOUS BLD VENIPUNCTURE: CPT

## 2021-10-16 PROCEDURE — 97166 OT EVAL MOD COMPLEX 45 MIN: CPT

## 2021-10-16 PROCEDURE — 83520 IMMUNOASSAY QUANT NOS NONAB: CPT

## 2021-10-16 PROCEDURE — 86038 ANTINUCLEAR ANTIBODIES: CPT

## 2021-10-16 PROCEDURE — 85652 RBC SED RATE AUTOMATED: CPT

## 2021-10-16 PROCEDURE — 97530 THERAPEUTIC ACTIVITIES: CPT

## 2021-10-16 PROCEDURE — 80048 BASIC METABOLIC PNL TOTAL CA: CPT

## 2021-10-16 PROCEDURE — 80061 LIPID PANEL: CPT

## 2021-10-16 PROCEDURE — 82784 ASSAY IGA/IGD/IGG/IGM EACH: CPT

## 2021-10-16 PROCEDURE — 99223 1ST HOSP IP/OBS HIGH 75: CPT | Performed by: PSYCHIATRY & NEUROLOGY

## 2021-10-16 PROCEDURE — 74011250637 HC RX REV CODE- 250/637: Performed by: NURSE PRACTITIONER

## 2021-10-16 PROCEDURE — 97162 PT EVAL MOD COMPLEX 30 MIN: CPT

## 2021-10-16 PROCEDURE — 97116 GAIT TRAINING THERAPY: CPT

## 2021-10-16 PROCEDURE — 82746 ASSAY OF FOLIC ACID SERUM: CPT

## 2021-10-16 PROCEDURE — 74011250636 HC RX REV CODE- 250/636: Performed by: INTERNAL MEDICINE

## 2021-10-16 PROCEDURE — 83519 RIA NONANTIBODY: CPT

## 2021-10-16 RX ORDER — SODIUM CHLORIDE 9 MG/ML
75 INJECTION, SOLUTION INTRAVENOUS CONTINUOUS
Status: DISCONTINUED | OUTPATIENT
Start: 2021-10-16 | End: 2021-10-17

## 2021-10-16 RX ORDER — POLYETHYLENE GLYCOL 3350 17 G/17G
17 POWDER, FOR SOLUTION ORAL DAILY
Status: DISCONTINUED | OUTPATIENT
Start: 2021-10-16 | End: 2021-10-17

## 2021-10-16 RX ORDER — ESCITALOPRAM OXALATE 10 MG/1
5 TABLET ORAL
Status: DISCONTINUED | OUTPATIENT
Start: 2021-10-16 | End: 2021-10-18 | Stop reason: HOSPADM

## 2021-10-16 RX ADMIN — ACETAMINOPHEN 650 MG: 325 TABLET ORAL at 11:30

## 2021-10-16 RX ADMIN — ACETAMINOPHEN 650 MG: 325 TABLET ORAL at 16:16

## 2021-10-16 RX ADMIN — ACETAMINOPHEN 650 MG: 325 TABLET ORAL at 21:14

## 2021-10-16 RX ADMIN — ESCITALOPRAM OXALATE 5 MG: 10 TABLET ORAL at 16:50

## 2021-10-16 RX ADMIN — POLYETHYLENE GLYCOL 3350 17 G: 17 POWDER, FOR SOLUTION ORAL at 08:15

## 2021-10-16 RX ADMIN — SODIUM CHLORIDE 75 ML/HR: 9 INJECTION, SOLUTION INTRAVENOUS at 16:41

## 2021-10-16 RX ADMIN — ACETAMINOPHEN 650 MG: 325 TABLET ORAL at 02:27

## 2021-10-16 RX ADMIN — ACETAMINOPHEN 650 MG: 325 TABLET ORAL at 06:54

## 2021-10-16 NOTE — PROGRESS NOTES
Problem: Self Care Deficits Care Plan (Adult)  Goal: *Acute Goals and Plan of Care (Insert Text)  Description:   FUNCTIONAL STATUS PRIOR TO ADMISSION: Patient was independent and active without use of DME however with impaired balance/strength with progressive decline since June 2021. Patient was modified independent for basic and instrumental ADLs. HOME SUPPORT: The patient lived with wife but did not require assist. Since June requiring some S/min A from wife due to progressive onset of weakness. Occupational Therapy Goals  Initiated 10/16/2021  1. Patient will perform grooming standing with modified independence within 7 day(s). 2.  Patient will perform lower body dressing with modified independence within 7 day(s). 3.  Patient will perform bathing with modified independence within 7 day(s). 4.  Patient will perform toilet transfers with modified independence within 7 day(s). 5.  Patient will perform all aspects of toileting with modified independence within 7 day(s). 6.  Patient will participate in upper extremity therapeutic exercise/activities with supervision/set-up for 15 minutes within 7 day(s). 7.  Patient will utilize energy conservation techniques during functional activities with verbal cues within 7 day(s).    Outcome: Progressing Towards Goal   OCCUPATIONAL THERAPY EVALUATION  Patient: Imtiaz Taylor (6 y.o. male)  Date: 10/16/2021  Primary Diagnosis: Generalized weakness [R53.1]  Unable to walk [R26.2]  Hyponatremia [E87.1]        Precautions:        ASSESSMENT  Based on the objective data described below, the patient presents with impaired functional mobility and independence with self care s/p admission with tremor/seizure like episode and progressive weakness, currently presents with global weakness, mild impaired coordination, fair balance, low vocal sound with extended discussion, fair activity tolerance with some self limiting behavior, mild anxiety with situation and inability to complete some tasks however able to complete all needed ADLs/functional mobility with overall SBA with rest breaks/pacing. Up in bathroom with SBA and increased time for rest breaks. Recommend OP Neuro OT for strengthening, coordination and ADL retraining. Current Level of Function Impacting Discharge (ADLs/self-care): overall SBA and up to min A for LB ADLs with fair functional reach    Functional Outcome Measure: The patient scored Total: 65/100 on the Barthel Index outcome measure which is indicative of being below baseline in basic self-care. Other factors to consider for discharge: progressive weakness since June, low vocal sound with activity, required pacing/increased time for activity, myasthenia work up/labs pending     Patient will benefit from skilled therapy intervention to address the above noted impairments. PLAN :  Recommendations and Planned Interventions: self care training, functional mobility training, therapeutic exercise, balance training, therapeutic activities, endurance activities, neuromuscular re-education, patient education, and home safety training    Frequency/Duration: Patient will be followed by occupational therapy 4 times a week to address goals. Recommendation for discharge: (in order for the patient to meet his/her long term goals)  Outpatient occupational therapy follow up recommended for Neuro OP OT    This discharge recommendation:  A follow-up discussion with the attending provider and/or case management is planned    IF patient discharges home will need the following DME: shower chair       SUBJECTIVE:   Patient stated \"Could this be related to a concussion I got in highschool? Louellen Rides re: anxious about situation/symptoms    OBJECTIVE DATA SUMMARY:   HISTORY:   Past Medical History:   Diagnosis Date    History of toxic encephalopathy     Weakness generalized    History reviewed. No pertinent surgical history.     Expanded or extensive additional review of patient history:     Home Situation  Home Environment: Private residence  # Steps to Enter: 4  Rails to Enter: No  One/Two Story Residence: Two story, live on 1st floor (BR on 1st floor)  Living Alone: No  Support Systems: Spouse/Significant Other (wife tries to assist with everything)  Patient Expects to be Discharged to[de-identified] House  Current DME Used/Available at Home: Cane, straight (1 fall from rug slip)  Tub or Shower Type: Shower (hasn't been showering)    Hand dominance: Right    EXAMINATION OF PERFORMANCE DEFICITS:  Cognitive/Behavioral Status:  Neurologic State: Alert  Orientation Level: Oriented X4  Cognition: Follows commands  Perception: Appears intactAlert     Safety/Judgement: Insight into deficits; Decreased awareness of need for safety    Skin: intact    Edema: none noted    Hearing: Auditory  Auditory Impairment: None    Vision/Perceptual:                                     Range of Motion:  B UE, slow to complete movement  AROM: Generally decreased, functional  PROM: Generally decreased, functional                      Strength:  B UE R>L 3/5 unable to withstand resistance,   Strength: Generally decreased, functional                Coordination:  Coordination: Generally decreased, functional  Fine Motor Skills-Upper: Left Impaired;Right Impaired         Tone & Sensation:  intact                            Balance:  Sitting: Intact  Standing: Impaired; With support  Standing - Static: Good  Standing - Dynamic : Fair    Functional Mobility and Transfers for ADLs:  Bed Mobility:  Supine to Sit: Supervision  Sit to Supine: Supervision    Transfers:  Sit to Stand: Supervision  Stand to Sit: Supervision  Bed to Chair: Supervision  Bathroom Mobility: Stand-by assistance  Toilet Transfer : Stand-by assistance    ADL Assessment:  Feeding: Setup    Oral Facial Hygiene/Grooming: Setup    Bathing:  Moderate assistance    Upper Body Dressing: Setup    Lower Body Dressing: Stand-by assistance    Toileting: Contact guard assistance         Completed OT evaluation and ADLs seated EOB and standing as able with SBA overall  for balance. No LOB, self limiting at times when asked to perform or trial balance assessment and ADL functioanl mobility. Educated on safety and endurance training with encouragement for full participation in ADLs while in hospital. Good understanding noted. ADL Intervention and task modifications:       Grooming  Position Performed: Standing  Washing Face: Set-up  Washing Hands: Set-up  Cues: Visual/perceptual training/retraining    Upper Body Bathing  Bathing Assistance: Set-up; Stand-by assistance  Position Performed: Standing  Cues: Visual/perceptual training/retraining    Lower Body Bathing  Bathing Assistance: Stand-by assistance  Cues: Visual/perceptual training/retraining    Upper Body Dressing Assistance  Dressing Assistance: Stand-by assistance  Cues: Visual/perceptual training/retraining    Lower Body Dressing Assistance  Socks: Moderate assistance  Position Performed: Seated edge of bed  Cues: Visual/perceptual training/retraining;Physical assistance; Amena Mckeon       Patient instructed and indicated understanding the benefits of maintaining activity tolerance, functional mobility, and independence with self care tasks during acute stay  to ensure safe return home and to baseline. Encouraged patient to increase frequency and duration OOB, be out of bed for all meals, perform daily ADLs (as approved by RN/MD regarding bathing etc), and performing functional mobility to/from bathroom. Cognitive Retraining  Safety/Judgement: Insight into deficits; Decreased awareness of need for safety    Therapeutic Exercise:     Functional Measure:    Barthel Index:  Bathin  Bladder: 10  Bowels: 10  Groomin  Dressin  Feedin  Mobility: 10  Stairs: 5  Toilet Use: 5  Transfer (Bed to Chair and Back): 10  Total: 65/100      The Barthel ADL Index: Guidelines  1.  The index should be used as a record of what a patient does, not as a record of what a patient could do. 2. The main aim is to establish degree of independence from any help, physical or verbal, however minor and for whatever reason. 3. The need for supervision renders the patient not independent. 4. A patient's performance should be established using the best available evidence. Asking the patient, friends/relatives and nurses are the usual sources, but direct observation and common sense are also important. However direct testing is not needed. 5. Usually the patient's performance over the preceding 24-48 hours is important, but occasionally longer periods will be relevant. 6. Middle categories imply that the patient supplies over 50 per cent of the effort. 7. Use of aids to be independent is allowed. Score Interpretation (from [facility] 83)    Independent   60-79 Minimally independent   40-59 Partially dependent   20-39 Very dependent   <20 Totally dependent     -Ilana Montes., Barthel, D.W. (1965). Functional evaluation: the Barthel Index. [facility] ([address] (1997). The Barthel activities of daily living index: self-reporting versus actual performance in the old (> or = 75 years). Journal of [address] [place] [id number] 457), [address] [facility], JARGENTINA, Odin Luz., Herbie Tobar. (1999). Measuring the change in disability after inpatient rehabilitation; comparison of the responsiveness of the Barthel Index and Functional [place] Measure. Journal of Neurology, Neurosurgery, and Psychiatry, 66(4), 352-493. Per Montero, N.J.A, MIRIAM Castillo.OSIRIS, & Hannah Thorpe, MGeorgiaA. (2004) Assessment of post-stroke quality of life in cost-effectiveness studies: The usefulness of the Barthel Index and the EuroQoL-5D.  Quality of Life Research, 15, 002-23     Occupational Therapy Evaluation Charge Determination   History Examination Decision-Making   MEDIUM Complexity : Expanded review of history including physical, cognitive and psychosocial  history  MEDIUM Complexity : 3-5 performance deficits relating to physical, cognitive , or psychosocial skils that result in activity limitations and / or participation restrictions MEDIUM Complexity : Patient may present with comorbidities that affect occupational performnce. Miniml to moderate modification of tasks or assistance (eg, physical or verbal ) with assesment(s) is necessary to enable patient to complete evaluation       Based on the above components, the patient evaluation is determined to be of the following complexity level: MEDIUM  Pain Rating:  none    Activity Tolerance:   Fair, SpO2 stable on RA, and requires frequent rest breaks    After treatment patient left in no apparent distress:    Supine in bed and Call bell within reach    COMMUNICATION/EDUCATION:   The patients plan of care was discussed with: Physical therapist, Registered nurse, Physician, and PT student . Home safety education was provided and the patient/caregiver indicated understanding., Patient/family have participated as able in goal setting and plan of care. , and Patient/family agree to work toward stated goals and plan of care. This patients plan of care is appropriate for delegation to [facility].     Thank you for this referral.  Dani Lima, OT  Time Calculation: 36 mins

## 2021-10-16 NOTE — CONSULTS
Consult  REFERRED BY:  None    CHIEF COMPLAINT: Generalized weakness, tremors      Subjective:     Krystyna Serrano is a 61 y.o. 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 Since June he has had increased difficulty with generalized fatigue and weakness, and 10 to 15 pound weight loss, cannot sleep at night, anxious all the time, and looks very anxious and depressed. Patient had MRI of his lumbar spine that shows mild degenerative arthritis and MRI of the cervical spine that shows the same thing, and MRI of the brain was unremarkable. Patient's CPK levels were normal, all of his other blood work was normal including B12, and thyroid etc.  And the patient just looks poorly conditioned and generally weak. He does not have any tremors at all now but is slightly hyperreflexic in appears to be gets very tense anxious and depressed. If no fasciculations and no focal atrophy of his muscles, but his muscle bulk and tone is decreased throughout. No trouble with his cranial nerves, he did not appear to have any major limitation of his cervical or lumbar spine. His laboratory studies are otherwise unremarkable except for borderline elevated blood sugar. He was taking no medications prior to admission. Past Medical History:   Diagnosis Date    History of toxic encephalopathy     Weakness generalized       History reviewed. No pertinent surgical history.   Family History   Problem Relation Age of Onset   Summers Arthritis-rheumatoid Father     Diabetes Brother       Social History     Tobacco Use    Smoking status: Never Smoker    Smokeless tobacco: Never Used   Substance Use Topics    Alcohol use: Not Currently         Current Facility-Administered Medications:     polyethylene glycol (MIRALAX) packet 17 g, 17 g, Oral, DAILY, Jessica Alas MD, 17 g at 10/16/21 0815    enoxaparin (LOVENOX) injection 40 mg, 40 mg, SubCUTAneous, Q24H, Loraine Dial MD, 40 mg at 10/15/21 1659    LORazepam (ATIVAN) injection 1 mg, 1 mg, IntraVENous, Q6H PRN, Loraine Dial MD    acetaminophen (TYLENOL) tablet 650 mg, 650 mg, Oral, Q4H PRN, 650 mg at 10/16/21 0654 **OR** acetaminophen (TYLENOL) solution 650 mg, 650 mg, Per NG tube, Q4H PRN **OR** acetaminophen (TYLENOL) suppository 650 mg, 650 mg, Rectal, Q4H PRN, Pamela Ag, NP        No Known Allergies   MRI Results (most recent):  Results from [place] encounter on 10/15/21    MRI LUMB SPINE W WO CONT    Narrative  EXAM: MRI LUMB SPINE W WO CONT    INDICATION: low back pain, unable to walk, b/l arm and leg weakness. COMPARISON: None    TECHNIQUE: MR imaging of the lumbar spine was performed using the following  sequences: sagittal T1, T2, STIR;  axial T1, T2 prior to and following contrast  administration. CONTRAST: 13 mL of ProHance. FINDINGS:    Conus position, morphology, signal and enhancement appear normal. No intraspinal  or paraspinal soft tissue mass or enhancement abnormality is shown. There is 4 mm anterolisthesis at L5-S1. Alignment is otherwise anatomic. Moderate right and mild left SI joint osteoarthrosis is noted in the partly  visualized superior sacrum and SI joints. Vertebral body heights are normal. Numerous hemangiomas are noted in the lumbar  and lower thoracic spine including a large hemangioma replacing the L2 vertebral  body. Bone signal is otherwise normal.    Lower thoracic spine: No herniation or stenosis. L1-L2: No herniation or stenosis. L2-L3: No herniation or stenosis. L3-L4: No herniation or stenosis. L4-L5: No herniation or stenosis. L5-S1: Normal disc height though diminished disc signal. Right lateral annular  tear. Mild diffuse disc bulging, accentuated in the right lateral and foraminal  regions. No substantial facet arthrosis. No canal stenosis. No substantial  subarticular stenosis. Mild bilateral foraminal stenosis. .    Impression  L5-S1 spondylosis as above. Normal-appearing distal cord and conus. Multiple hemangiomas with diffuse vertebral body replacement and L2. Results from [place] encounter on 10/15/21    MRI LUMB SPINE W WO CONT    Narrative  EXAM: MRI LUMB SPINE W WO CONT    INDICATION: low back pain, unable to walk, b/l arm and leg weakness. COMPARISON: None    TECHNIQUE: MR imaging of the lumbar spine was performed using the following  sequences: sagittal T1, T2, STIR;  axial T1, T2 prior to and following contrast  administration. CONTRAST: 13 mL of ProHance. FINDINGS:    Conus position, morphology, signal and enhancement appear normal. No intraspinal  or paraspinal soft tissue mass or enhancement abnormality is shown. There is 4 mm anterolisthesis at L5-S1. Alignment is otherwise anatomic. Moderate right and mild left SI joint osteoarthrosis is noted in the partly  visualized superior sacrum and SI joints. Vertebral body heights are normal. Numerous hemangiomas are noted in the lumbar  and lower thoracic spine including a large hemangioma replacing the L2 vertebral  body. Bone signal is otherwise normal.    Lower thoracic spine: No herniation or stenosis. L1-L2: No herniation or stenosis. L2-L3: No herniation or stenosis. L3-L4: No herniation or stenosis. L4-L5: No herniation or stenosis. L5-S1: Normal disc height though diminished disc signal. Right lateral annular  tear. Mild diffuse disc bulging, accentuated in the right lateral and foraminal  regions. No substantial facet arthrosis. No canal stenosis. No substantial  subarticular stenosis. Mild bilateral foraminal stenosis. .    Impression  L5-S1 spondylosis as above.   Normal-appearing distal cord and conus.  Multiple hemangiomas with diffuse vertebral body replacement and L2. Review of Systems:  A comprehensive review of systems was negative except for: Constitutional: positive for fatigue and malaise  Musculoskeletal: positive for myalgias, arthralgias, stiff joints, neck pain, back pain and muscle weakness  Neurological: positive for weakness  Behvioral/Psych: positive for anxiety and depression   Vitals:    10/16/21 0947 10/16/21 0949 10/16/21 0956 10/16/21 1007   BP: 135/78 123/77 107/72 117/76   Pulse: 90 96 (!) 112 (!) 108   Resp:       Temp:       SpO2:    96%   Weight:       Height:         Objective:     I      NEUROLOGICAL EXAM:    Appearance: The patient is poorly developed and nourished, provides a coherent history and is in no acute distress. Mental Status: Oriented to time, place and person, and the president, cognitive function is normal and speech is fluent and no aphasia or dysarthria. Mood and affect appropriate very depressed and anxious. Cranial Nerves:   Intact visual fields. Fundi are benign, disc are flat, no lesions seen on funduscopy. JESUSITA, EOM's full, no nystagmus, no ptosis. Facial sensation is normal. Corneal reflexes are not tested. Facial movement is symmetric. Hearing is normal bilaterally. Palate is midline with normal sternocleidomastoid and trapezius muscles are normal. Tongue is midline. Neck without meningismus or bruits  Temporal arteries are not tender or enlarged  TMJ areas are not tender on palpation   Motor:  4/5 strength in upper and lower proximal and distal muscles. Normal bulk and tone. No fasciculations. Rapid alternating movement is symmetric and intact bilaterally   Reflexes:   Deep tendon reflexes 2+/4 and symmetrical.  No babinski or clonus present   Sensory:   Normal to touch, pinprick and vibration and temperature.   DSS is intact   Gait:  Abnormal gait for patient's age as he is generally weak and probably needs a cane or walker due to his poor conditioning. Tremor:   No tremor noted. Cerebellar:  No abnormal cerebellar signs present on Romberg and tandem testing and finger-nose-finger exam.   Neurovascular:  Normal heart sounds and regular rhythm, peripheral pulses intact, and no carotid bruits. Assessment:       ICD-10-CM ICD-9-CM    1. Ambulatory dysfunction  R26.2 719.7    2. Weakness  R53.1 780.79      Active Problems:    Hyponatremia (10/15/2021)      Generalized weakness (10/15/2021)      Unable to walk (10/15/2021)      History of toxic encephalopathy ()      Weakness generalized ()      Tremors of nervous system (10/16/2021)      Lumbosacral radiculopathy due to degenerative joint disease of spine (10/16/2021)      Cervical radiculopathy due to degenerative joint disease of spine (12/27/1082)      Metabolic myopathy (68/99/9057)      Chronic insomnia (10/16/2021)      Depression (10/16/2021)        Plan:     Patient with normal MRI scans except for minimal arthritis of the cervical and lumbar spine and a normal MRI of the brain for his age. All his lab studies are relatively normal including CPK levels, thyroid, and B12 levels, and the rest of his labs, and he should be able to go home with some outpatient therapy and generalized conditioning would probably benefit from a mild antidepressant to help him sleep at night so we will start some Lexapro. He can follow-up in the office as an outpatient EEG for his tremors, but they are gone now probably more anxiety related, and he can follow-up in our office for possible EMG studies if his sensation of weakness persists. Setting up a myasthenia titer if you have a blood test but he does not have to wait for them, he can go home today with outpatient therapy. He probably needs psychiatric referral.  Continue excellent medical care as you are, we will follow while here.     Signed By: Perla Wagner MD     October 16, 2021       CC: None  FAX: None

## 2021-10-16 NOTE — PROGRESS NOTES
Problem: Mobility Impaired (Adult and Pediatric)  Goal: *Acute Goals and Plan of Care (Insert Text)  Description: FUNCTIONAL STATUS PRIOR TO ADMISSION: Patient was independent and without use of DME however with impaired balance/strength with progressive decline since June 2021. Patient was modified independent for basic and instrumental ADLs. HOME SUPPORT: The patient lived with wife who provided assistance. Patients states \"she helped me with everything\" since June requiring some S/min A due to progressive onset of weakness. Physical Therapy Goals  Initiated 10/16/2021  1. Patient will move from supine to sit and sit to supine , scoot up and down, and roll side to side in bed with modified independence within 7 day(s). 2.  Patient will transfer from bed to chair and chair to bed with modified independence using the least restrictive device within 7 day(s). 3.  Patient will perform sit to stand with modified independence within 7 day(s). 4.  Patient will ambulate with modified independence for 150 feet with the least restrictive device within 7 day(s). 5.  Patient will ascend/descend 4 stairs without handrail(s) with minimal assistance/contact guard assist within 7 day(s). Outcome: Not Met   PHYSICAL THERAPY EVALUATION  Patient: Jairo Chou (57 y.o. male)  Date: 10/16/2021  Primary Diagnosis: Generalized weakness [R53.1]  Unable to walk [R26.2]  Hyponatremia [E87.1]        Precautions: None       ASSESSMENT  Based on the objective data described below, the patient presents with generalized weakness, decreased activity tolerance with self-limiting behavior, impaired gait mechanics, intermittently impaired coordination, and decreased overall independence following admission for weakness and tremors. MRI of brain normal. Patient subjectively reporting progressive weakness since June of 2021 that has impacted all aspects of his life, general anxiety over this change of status.  Patient with low voice during session that patient reports progresses when he is fatigued, though with moments of ability to project. He is able to complete all basic mobility (bed mobility, transfers, and ambulation) without much physical assist, intermittent CGA during ambulation for steadying. Questionable reliance of formal manual muscle testing as patient breaking position prior to resistance being applied, possibly due to fatigue/inability to sustain position. No atrophy noted. Demonstrates good static standing balance, able to hold narrow SARIKA with eyes closed x35\" without assist/LOB. Demonstrates good control and no unsteadiness during dynamic assessment though patient c/o fatigue and only holding positions for minimal time. Demonstrates wide SARIKA during ambulation without assistive device with very slow guarded movement, no ataxic gait pattern noted. Patient requesting to return to bed at end of evaluation due to fatigue and reports he knows his back will hurt in the chair that is provided. Patient requesting d/c to rehab facility as he feels he will not be able to manage at home. Per functional evaluation, patient would likely be able to d/c to home with OP neuro PT. Patient will benefit from skilled PT while in hospital to address mentioned impairments and improve overall mobility status. 10/16/21 0949 10/16/21 0956 10/16/21 1007   Vital Signs   Pulse (Heart Rate) 96 (!) 112 (!) 108   Heart Rate Source Monitor  --   --    Level of Consciousness  --   --  Alert (0)   /77 107/72 117/76   MAP (Calculated) 92 84 90   BP 1 Location Left upper arm Left upper arm Left upper arm   BP 1 Method Automatic Automatic Automatic   BP Patient Position Sitting Standing Sitting  (after activity)     Current Level of Function Impacting Discharge (mobility/balance): Supervision bed mobility and transfers; CGA ambulation    Functional Outcome Measure:   The patient scored 31/56 on the Encarnacion Balance Assessment outcome measure which is indicative of moderate fall risk. Other factors to consider for discharge: progressive weakness since June, very limited activity tolerance      Patient will benefit from skilled therapy intervention to address the above noted impairments. PLAN :  Recommendations and Planned Interventions: bed mobility training, transfer training, gait training, therapeutic exercises, neuromuscular re-education, patient and family training/education and therapeutic activities      Frequency/Duration: Patient will be followed by physical therapy:  4 times a week to address goals. Recommendation for discharge: (in order for the patient to meet his/her long term goals)  Outpatient physical therapy follow up recommended for neuro rehab    This discharge recommendation:  A follow-up discussion with the attending provider and/or case management is planned    IF patient discharges home will need the following DME: rolling walker     SUBJECTIVE:   Patient stated I'm really scared of what is going on.    OBJECTIVE DATA SUMMARY:   HISTORY:    Past Medical History:   Diagnosis Date    History of toxic encephalopathy     Weakness generalized    History reviewed. No pertinent surgical history.     Personal factors and/or comorbidities impacting plan of care: Chemical exposure 30 years ago    [address]: Private residence  # Steps to Enter: 4  Rails to Enter: No  One/Two Story Residence: Two story, live on 1st floor (BR on 1st floor)  Living Alone: No  Support Systems: Spouse/Significant Other (wife tries to assist with everything)  [facility] [place]  Current DME Used/Available at [facility] 30, straight (1 fall from rug slip)  Tub or Shower Type: Shower (hasn't been showering)    EXAMINATION/PRESENTATION/DECISION MAKING:   Critical Behavior:  Neurologic State: Alert  Orientation Level: Oriented X4  Cognition: Follows commands  Safety/Judgement: Insight into deficits, Decreased awareness of need for safety  Hearing: Auditory  Auditory Impairment: None    Range Of Motion:  AROM: Generally decreased, functional  PROM: Generally decreased, functional  Strength:    Strength: Generally decreased, functional  Tone & Sensation:   Sensation: Intact    Coordination:  Coordination: Generally decreased, functional; difficulty with toe tapping on L LE though no ataxic movement noted    Functional Mobility:  Bed Mobility:  Supine to Sit: Supervision  Sit to Supine: Supervision  Scooting: Supervision  Transfers:  Sit to Stand: Supervision  Stand to Sit: Supervision  Bed to Chair: Supervision  Balance:   Sitting: Intact  Standing: Impaired; With support  Standing - Static: Good  Standing - Dynamic : Fair  Ambulation/Gait Training:  Distance (ft): 35 Feet (ft)  Assistive Device: Gait belt  Ambulation - Level of Assistance: Contact guard assistance  Gait Abnormalities: Decreased step clearance;Trunk sway increased  Base of Support: Widened  Speed/Eri: Slow    Functional Measure:  Encarnacion Balance Test:    Sitting to Standin  Standing Unsupported: 3  Sitting with Back Unsupported: 4  Standing to Sitting: 3  Transfers: 2  Standing Unsupported with Eyes Closed: 3  Standing Unsupported with Feet Together: 3  Reach Forward with Outstretched Arm: 1   Object: 1  Turn to Look Over Shoulders: 3  Turn 360 Degrees: 2  Alternate Foot on Step/Stool: 2  Standing Unsupported One Foot in Front: 1  Stand on One Le  Total: 31/56       56=Maximum possible score;   0-20=High fall risk  21-40=Moderate fall risk   41-56=Low fall risk      Physical Therapy Evaluation Charge Determination   History Examination Presentation Decision-Making   MEDIUM  Complexity : 1-2 comorbidities / personal factors will impact the outcome/ POC  MEDIUM Complexity : 3 Standardized tests and measures addressing body structure, function, activity limitation and / or participation in recreation  MEDIUM Complexity : Evolving with changing characteristics  Other outcome measures Encarnacion Balance 31/56  MEDIUM      Based on the above components, the patient evaluation is determined to be of the following complexity level: MEDIUM    Pain Ratin/10    Activity Tolerance:   Fair, Poor, SpO2 stable on RA, requires frequent rest breaks and observed SOB with activity    After treatment patient left in no apparent distress:   Chair position in bed, Call bell within reach and Side rails x 3    COMMUNICATION/EDUCATION:   The patients plan of care was discussed with: Occupational therapist and Registered nurse. Fall prevention education was provided and the patient/caregiver indicated understanding., Patient/family have participated as able in goal setting and plan of care. and Patient/family agree to work toward stated goals and plan of care.     Thank you for this referral.  Kacie Wilks, PT   Time Calculation: 36 mins

## 2021-10-16 NOTE — PROGRESS NOTES
Hospitalist Progress Note    NAME: Mara Jose   :  1955   MRN:  [id number]       Assessment / Plan:  Tremors, with reported intermittent shaking in arms and legs  Generalized weakness, POA legs > arms causing inability of walk  Hx of photo developing chemical exposure causing generalized weakness, tremors which resolved after forced snf and disability. --Evaluated by telemetry neurologist in the ER . There is concern for ischemia or demyelination or potentially neurodegenerative neuromuscular disease. --MRI of the lumbar spine showed  L5-S1: Normal disc height though diminished disc signal. Right lateral annular  tear. Mild diffuse disc bulging, accentuated in the right lateral and foraminal  regions. No substantial facet arthrosis. No canal stenosis. No substantial  subarticular stenosis. Mild bilateral foraminal stenosis. Shantelle  MRI of the cervical spine showed  . Normal cord signal.  2. C5-6 and C6-7 cervical spondylosis as above. There is mild left anterolateral  cord compression at C5-6. There is left subarticular and foraminal stenosis at  C5-6 and C6-7  Neurology input appreciated, additional work-up done  B78 folic acid and TSH were within normal limit  Most likely a component of anxiety, Lexapro added by neurology  OT recommended outpatient rehab but patient would like to go to inpatient rehab  We will consult   Patient EEG per neurology note    Hyponatremia Na 131, worsening  --Sodium worsening to 125, will add IV fluid and consult nephrology    Body mass index is 25.61 kg/m².     Code: full  DVT prophylaxis: lovenox  Surrogate decision maker:  Wife Hallie Park     25.0 - 29.9 Overweight / Body mass index is 25.61 kg/m². Estimated discharge date:   Barriers:  Discussed with wife at bedside  Recommended Disposition:  PT, OT, RN     Subjective:     Chief Complaint / Reason for Physician Visit  Follow-up tremors.   Patient very anxious about his symptoms and would like to go to inpatient rehab as he reported that his wife could not care for him because he is so weak   discussed with RN events overnight. Review of Systems:  Symptom Y/N Comments  Symptom Y/N Comments   Fever/Chills    Chest Pain n    Poor Appetite    Edema     Cough    Abdominal Pain n    Sputum    Joint Pain     SOB/DOMINGUEZ n   Pruritis/Rash     Nausea/vomit n   Tolerating PT/OT     Diarrhea    Tolerating Diet y    Constipation    Other       Could NOT obtain due to:      Objective:     VITALS:   Last 24hrs VS reviewed since prior progress note. Most recent are:  Patient Vitals for the past 24 hrs:   Temp Pulse Resp BP SpO2   10/16/21 1430 97.9 °F (36.6 °C) 97  125/75 98 %   10/16/21 1200  75      10/16/21 1007  (!) 108  117/76 96 %   10/16/21 0956  (!) 112  107/72    10/16/21 0949  96  123/77    10/16/21 0947  90  135/78    10/16/21 0817 97.8 °F (36.6 °C) 79 18 128/74 96 %   10/16/21 0800  79      10/15/21 2312 97.8 °F (36.6 °C) 67 18 (!) 101/59 96 %   10/15/21 1953 97.8 °F (36.6 °C) 68 18 (!) 103/59 93 %   10/15/21 1620 98.6 °F (37 °C) 80 16 112/65 96 %     No intake or output data in the 24 hours ending 10/16/21 1443     I had a face to face encounter and independently examined this patient on 10/16/2021, as outlined below:  PHYSICAL EXAM:  General: WD, WN. Alert, cooperative, no acute distress    EENT:  EOMI. Anicteric sclerae. MMM  Resp:  CTA bilaterally, no wheezing or rales. No accessory muscle use  CV:  Regular  rhythm,  No edema  GI:  Soft, Non distended, Non tender. +Bowel sounds  Neurologic:  Alert and oriented X 3, normal speech,   Psych:   Good insight. Not anxious nor agitated  Skin:  No rashes.   No jaundice    Reviewed most current lab test results and cultures  YES  Reviewed most current radiology test results   YES  Review and summation of old records today    NO  Reviewed patient's current orders and MAR    YES  PMH/SH reviewed - no change compared to H&P  ________________________________________________________________________  Care Plan discussed with:    Comments   Patient x    Family  x  wife   RN x    Care Manager     Consultant  x  neurology                     Multidiciplinary team rounds were held today with , nursing, pharmacist and clinical coordinator. Patient's plan of care was discussed; medications were reviewed and discharge planning was addressed. ________________________________________________________________________  Total NON critical care TIME: 40 Minutes    Total CRITICAL CARE TIME Spent:   Minutes non procedure based      Comments   >50% of visit spent in counseling and coordination of care     ________________________________________________________________________  Elie Carpenter MD     Procedures: see electronic medical records for all procedures/Xrays and details which were not copied into this note but were reviewed prior to creation of Plan. LABS:  I reviewed today's most current labs and imaging studies.   Pertinent labs include:  Recent Labs     10/15/21  0728   WBC 7.2   HGB 14.3   HCT 41.0        Recent Labs     10/16/21  1126 10/15/21  0728   * 131*   K 4.1 3.9   CL 96* 100   CO2 24 28   * 113*   BUN 14 16   CREA 0.63* 0.85   CA 9.1 9.6   MG  --  2.5*   ALB  --  3.8   TBILI  --  0.7   ALT  --  32       Signed: Elie Carpenter MD

## 2021-10-16 NOTE — PROGRESS NOTES
Problem: Patient Education: Go to Patient Education Activity  Goal: Patient/Family Education  Outcome: Progressing Towards Goal     Problem: TIA/CVA Stroke: 0-24 hours  Goal: Off Pathway (Use only if patient is Off Pathway)  Outcome: Progressing Towards Goal  Goal: Activity/Safety  Outcome: Progressing Towards Goal  Goal: Consults, if ordered  Outcome: Progressing Towards Goal  Goal: Diagnostic Test/Procedures  Outcome: Progressing Towards Goal  Goal: Nutrition/Diet  Outcome: Progressing Towards Goal  Goal: Discharge Planning  Outcome: Progressing Towards Goal  Goal: Medications  Outcome: Progressing Towards Goal  Goal: Respiratory  Outcome: Progressing Towards Goal  Goal: Treatments/Interventions/Procedures  Outcome: Progressing Towards Goal  Goal: Minimize risk of bleeding post-thrombolytic infusion  Outcome: Progressing Towards Goal  Goal: Monitor for complications post-thrombolytic infusion  Outcome: Progressing Towards Goal  Goal: Psychosocial  Outcome: Progressing Towards Goal  Goal: *Hemodynamically stable  Outcome: Progressing Towards Goal  Goal: *Neurologically stable  Description: Absence of additional neurological deficits    Outcome: Progressing Towards Goal  Goal: *Verbalizes anxiety and depression are reduced or absent  Outcome: Progressing Towards Goal  Goal: *Absence of Signs of Aspiration on Current Diet  Outcome: Progressing Towards Goal  Goal: *Absence of deep venous thrombosis signs and symptoms(Stroke Metric)  Outcome: Progressing Towards Goal  Goal: *Ability to perform ADLs and demonstrates progressive mobility and function  Outcome: Progressing Towards Goal  Goal: *Stroke education started(Stroke Metric)  Outcome: Progressing Towards Goal  Goal: *Dysphagia screen performed(Stroke Metric)  Outcome: Progressing Towards Goal  Goal: *Rehab consulted(Stroke Metric)  Outcome: Progressing Towards Goal     Problem: TIA/CVA Stroke: Day 2 Until Discharge  Goal: Off Pathway (Use only if patient is Off Pathway)  Outcome: Progressing Towards Goal  Goal: Activity/Safety  Outcome: Progressing Towards Goal  Goal: Diagnostic Test/Procedures  Outcome: Progressing Towards Goal  Goal: Nutrition/Diet  Outcome: Progressing Towards Goal  Goal: Discharge Planning  Outcome: Progressing Towards Goal  Goal: Medications  Outcome: Progressing Towards Goal  Goal: Respiratory  Outcome: Progressing Towards Goal  Goal: Treatments/Interventions/Procedures  Outcome: Progressing Towards Goal  Goal: Psychosocial  Outcome: Progressing Towards Goal  Goal: *Verbalizes anxiety and depression are reduced or absent  Outcome: Progressing Towards Goal  Goal: *Absence of aspiration  Outcome: Progressing Towards Goal  Goal: *Absence of deep venous thrombosis signs and symptoms(Stroke Metric)  Outcome: Progressing Towards Goal  Goal: *Optimal pain control at patient's stated goal  Outcome: Progressing Towards Goal  Goal: *Tolerating diet  Outcome: Progressing Towards Goal  Goal: *Ability to perform ADLs and demonstrates progressive mobility and function  Outcome: Progressing Towards Goal  Goal: *Stroke education continued(Stroke Metric)  Outcome: Progressing Towards Goal     Problem: Ischemic Stroke: Discharge Outcomes  Goal: *Verbalizes anxiety and depression are reduced or absent  Outcome: Progressing Towards Goal  Goal: *Verbalize understanding of risk factor modification(Stroke Metric)  Outcome: Progressing Towards Goal  Goal: *Hemodynamically stable  Outcome: Progressing Towards Goal  Goal: *Absence of aspiration pneumonia  Outcome: Progressing Towards Goal  Goal: *Aware of needed dietary changes  Outcome: Progressing Towards Goal  Goal: *Verbalize understanding of prescribed medications including anti-coagulants, anti-lipid, and/or anti-platelets(Stroke Metric)  Outcome: Progressing Towards Goal  Goal: *Tolerating diet  Outcome: Progressing Towards Goal  Goal: *Aware of follow-up diagnostics related to anticoagulants  Outcome: Progressing Towards Goal  Goal: *Ability to perform ADLs and demonstrates progressive mobility and function  Outcome: Progressing Towards Goal  Goal: *Absence of DVT(Stroke Metric)  Outcome: Progressing Towards Goal  Goal: *Absence of aspiration  Outcome: Progressing Towards Goal  Goal: *Optimal pain control at patient's stated goal  Outcome: Progressing Towards Goal  Goal: *Home safety concerns addressed  Outcome: Progressing Towards Goal  Goal: *Describes available resources and support systems  Outcome: Progressing Towards Goal  Goal: *Verbalizes understanding of activation of EMS(911) for stroke symptoms(Stroke Metric)  Outcome: Progressing Towards Goal  Goal: *Understands and describes signs and symptoms to report to providers(Stroke Metric)  Outcome: Progressing Towards Goal  Goal: *Neurolgocially stable (absence of additional neurological deficits)  Outcome: Progressing Towards Goal  Goal: *Verbalizes importance of follow-up with primary care physician(Stroke Metric)  Outcome: Progressing Towards Goal  Goal: *Smoking cessation discussed,if applicable(Stroke Metric)  Outcome: Progressing Towards Goal  Goal: *Depression screening completed(Stroke Metric)  Outcome: Progressing Towards Goal     Problem: Falls - Risk of  Goal: *Absence of Falls  Description: Document Heather Fall Risk and appropriate interventions in the flowsheet.   Outcome: Progressing Towards Goal  Note: Fall Risk Interventions:  Mobility Interventions: Bed/chair exit alarm, Communicate number of staff needed for ambulation/transfer, OT consult for ADLs, Patient to call before getting OOB, PT Consult for mobility concerns, PT Consult for assist device competence              Elimination Interventions: Call light in reach, Patient to call for help with toileting needs    History of Falls Interventions: Room close to nurse's station         Problem: Patient Education: Go to Patient Education Activity  Goal: Patient/Family Education  Outcome: Progressing Towards Goal     Problem: Seizure Disorder (Adult)  Goal: *STG: Remains free of seizure activity  Outcome: Progressing Towards Goal  Goal: *STG: Maintains lab values within therapeutic range  Outcome: Progressing Towards Goal  Goal: *STG/LTG: Complies with medication therapy  Outcome: Progressing Towards Goal  Goal: *STG: Remains free of injury during seizure activity  Outcome: Progressing Towards Goal  Goal: *STG: Remains safe in hospital  Outcome: Progressing Towards Goal  Goal: Interventions  Outcome: Progressing Towards Goal     Problem: Patient Education: Go to Patient Education Activity  Goal: Patient/Family Education  Outcome: Progressing Towards Goal     Problem: Seizure Disorder (Adult)  Goal: *STG: Remains free of seizure activity  Outcome: Progressing Towards Goal  Goal: *STG: Maintains lab values within therapeutic range  Outcome: Progressing Towards Goal  Goal: *STG/LTG: Complies with medication therapy  Outcome: Progressing Towards Goal  Goal: *STG: Remains free of injury during seizure activity  Outcome: Progressing Towards Goal  Goal: *STG: Remains safe in hospital  Outcome: Progressing Towards Goal  Goal: Interventions  Outcome: Progressing Towards Goal

## 2021-10-16 NOTE — PROGRESS NOTES
End of Shift Note    Bedside shift change report given to Buddy Pugh RN  (oncoming nurse) by Leda Mejias RN (offgoing nurse). Report included the following information SBAR, Kardex, ED Summary, Procedure Summary, Intake/Output, MAR, Accordion, Recent Results and Med Rec Status    Shift worked:  7p- 7a   Shift summary and any significant changes:     none        Concerns for physician to address: none   Zone phone for oncoming shift:   [phone]     Patient Information  Jessy Claros  61 y.o.  10/15/2021  6:29 AM by Jelly Nicolas MD. Jessy Claros was admitted from Home    Problem List  Patient Active Problem List    Diagnosis Date Noted    Hyponatremia 10/15/2021    Generalized weakness 10/15/2021    Unable to walk 10/15/2021     No past medical history on file. Core Measures:  CVA: No Yes  CHF:No No  PNA:No No    Activity:  Activity Level: Up with Assistance  Number times ambulated in hallways past shift: 1  Number of times OOB to chair past shift: 0    Cardiac:   Cardiac Monitoring: Yes           Access:   Current line(s): PIV       Genitourinary:   Urinary status: voiding      Respiratory:   O2 Device: None (Room air)  Chronic home O2 use?: NO  Incentive spirometer at bedside: NO       GI:  Last Bowel Movement Date: 10/14/21  Current diet:  ADULT DIET Regular  Passing flatus: YES  Tolerating current diet: YES       Pain Management:   Patient states pain is manageable on current regimen: YES    Skin:  Jonathan Score: 20  Interventions: increase time out of bed    Patient Safety:  Fall Score:  Total Score: 3  Interventions: bed/chair alarm, assistive device (walker, cane, etc), gripper socks, pt to call before getting OOB and stay with me (per policy)     @Rollbelt  @dexterity to release roll belt  Yes/No ( must document dexterity  here by stating Yes or No here, otherwise this is a restraint and must follow restraint documentation policy.)    DVT prophylaxis:  DVT prophylaxis Med- Yes  DVT prophylaxis SCD or YISSEL- No     Wounds: (If Applicable)  Wounds- No  Location     Active Consults:  IP CONSULT TO NEUROLOGY    Length of Stay:  Expected LOS: - - -  Actual LOS: 1  Discharge Plan: No TBD      Margarita Diggs RN

## 2021-10-17 LAB
ANION GAP SERPL CALC-SCNC: 8 MMOL/L (ref 5–15)
BUN SERPL-MCNC: 10 MG/DL (ref 6–20)
BUN/CREAT SERPL: 14 (ref 12–20)
CALCIUM SERPL-MCNC: 8.7 MG/DL (ref 8.5–10.1)
CHLORIDE SERPL-SCNC: 95 MMOL/L (ref 97–108)
CO2 SERPL-SCNC: 22 MMOL/L (ref 21–32)
COMMENT, HOLDF: NORMAL
CREAT SERPL-MCNC: 0.69 MG/DL (ref 0.7–1.3)
GLUCOSE SERPL-MCNC: 141 MG/DL (ref 65–100)
OSMOLALITY SERPL: 261 MOSM/KG H2O
OSMOLALITY UR: 496 MOSM/KG H2O
POTASSIUM SERPL-SCNC: 4.1 MMOL/L (ref 3.5–5.1)
SAMPLES BEING HELD,HOLD: NORMAL
SODIUM SERPL-SCNC: 125 MMOL/L (ref 136–145)
SODIUM SERPL-SCNC: 131 MMOL/L (ref 136–145)
SODIUM UR-SCNC: 127 MMOL/L
URATE SERPL-MCNC: 2.6 MG/DL (ref 3.5–7.2)

## 2021-10-17 PROCEDURE — 65660000000 HC RM CCU STEPDOWN

## 2021-10-17 PROCEDURE — 83930 ASSAY OF BLOOD OSMOLALITY: CPT

## 2021-10-17 PROCEDURE — 74011250637 HC RX REV CODE- 250/637: Performed by: NURSE PRACTITIONER

## 2021-10-17 PROCEDURE — 74011250636 HC RX REV CODE- 250/636: Performed by: HOSPITALIST

## 2021-10-17 PROCEDURE — 80048 BASIC METABOLIC PNL TOTAL CA: CPT

## 2021-10-17 PROCEDURE — 84300 ASSAY OF URINE SODIUM: CPT

## 2021-10-17 PROCEDURE — 74011250637 HC RX REV CODE- 250/637: Performed by: INTERNAL MEDICINE

## 2021-10-17 PROCEDURE — 74011250636 HC RX REV CODE- 250/636: Performed by: INTERNAL MEDICINE

## 2021-10-17 PROCEDURE — 83935 ASSAY OF URINE OSMOLALITY: CPT

## 2021-10-17 PROCEDURE — 84295 ASSAY OF SERUM SODIUM: CPT

## 2021-10-17 PROCEDURE — 99233 SBSQ HOSP IP/OBS HIGH 50: CPT | Performed by: PSYCHIATRY & NEUROLOGY

## 2021-10-17 PROCEDURE — 74011250637 HC RX REV CODE- 250/637: Performed by: PSYCHIATRY & NEUROLOGY

## 2021-10-17 PROCEDURE — 36415 COLL VENOUS BLD VENIPUNCTURE: CPT

## 2021-10-17 PROCEDURE — 84550 ASSAY OF BLOOD/URIC ACID: CPT

## 2021-10-17 RX ORDER — LANOLIN ALCOHOL/MO/W.PET/CERES
3 CREAM (GRAM) TOPICAL DAILY
Status: DISCONTINUED | OUTPATIENT
Start: 2021-10-17 | End: 2021-10-18 | Stop reason: DRUGHIGH

## 2021-10-17 RX ADMIN — ENOXAPARIN SODIUM 40 MG: 40 INJECTION SUBCUTANEOUS at 16:02

## 2021-10-17 RX ADMIN — TOLVAPTAN 15 MG: 30 TABLET ORAL at 13:38

## 2021-10-17 RX ADMIN — ACETAMINOPHEN 650 MG: 325 TABLET ORAL at 03:03

## 2021-10-17 RX ADMIN — MELATONIN 3 MG: at 20:14

## 2021-10-17 RX ADMIN — ACETAMINOPHEN 650 MG: 325 TABLET ORAL at 18:49

## 2021-10-17 RX ADMIN — ACETAMINOPHEN 650 MG: 325 TABLET ORAL at 13:38

## 2021-10-17 RX ADMIN — SODIUM CHLORIDE 75 ML/HR: 9 INJECTION, SOLUTION INTRAVENOUS at 05:47

## 2021-10-17 RX ADMIN — ESCITALOPRAM OXALATE 5 MG: 10 TABLET ORAL at 16:01

## 2021-10-17 RX ADMIN — ACETAMINOPHEN 650 MG: 325 TABLET ORAL at 08:43

## 2021-10-17 RX ADMIN — POLYETHYLENE GLYCOL 3350 17 G: 17 POWDER, FOR SOLUTION ORAL at 08:43

## 2021-10-17 NOTE — PROGRESS NOTES
End of Shift Note    Bedside shift change report given to Ariana Armstrong RN  (oncoming nurse) by MAXIMINO Costello (offgoing nurse). Report included the following information SBAR, Kardex, Intake/Output and MAR    Shift worked:  7a-7p   Shift summary and any significant changes:    Nephrology consult, neuro eval, sodium lab done        Concerns for physician to address: Pt and wife want inpt rehab and want to know costs out of pocket if he does not qualify due to pt/ot reccomendations   Zone phone for oncoming shift:   [phone]     Patient Information  Cecil Dickerson  61 y.o.  10/15/2021  6:29 AM by Stephany Manley MD. Cecil Dickerson was admitted from Home    Problem List  Patient Active Problem List    Diagnosis Date Noted    Tremors of nervous system 10/16/2021    Lumbosacral radiculopathy due to degenerative joint disease of spine 10/16/2021    Cervical radiculopathy due to degenerative joint disease of spine 35/07/9873    Metabolic myopathy 96/17/1683    Chronic insomnia 10/16/2021    Depression 10/16/2021    History of toxic encephalopathy     Weakness generalized     Hyponatremia 10/15/2021    Generalized weakness 10/15/2021    Unable to walk 10/15/2021     Past Medical History:   Diagnosis Date    History of toxic encephalopathy     Weakness generalized        Core Measures: Activity:  Activity Level:  Up with Assistance  Number times ambulated in hallways past shift: 0  Number of times OOB to chair past shift: 3    Cardiac:   Cardiac Monitoring: Yes      Cardiac Rhythm: Sinus Rhythm    Access:   Current line(s): PIV     Genitourinary:   Urinary status: voiding    Respiratory:   O2 Device: None (Room air)  Chronic home O2 use?: NO  Incentive spirometer at bedside: NO       GI:  Last Bowel Movement Date: 10/16/21  Current diet:  ADULT DIET Regular  Passing flatus: YES  Tolerating current diet: NO       Pain Management:   Patient states pain is manageable on current regimen: NO    Skin:  Jonathan Score: 20  Interventions: increase time out of bed    Patient Safety:  Fall Score:  Total Score: 3  Interventions: bed/chair alarm, assistive device (walker, cane, etc), gripper socks and pt to call before getting OOB  High Fall Risk: Yes  @Rollbelt  @dexterity to release roll belt  Yes/No ( must document dexterity  here by stating Yes or No here, otherwise this is a restraint and must follow restraint documentation policy.)    DVT prophylaxis:  DVT prophylaxis Med- No  DVT prophylaxis SCD or YISSEL- No     Wounds: (If Applicable)  Wounds- No  Location     Active Consults:  IP CONSULT TO NEUROLOGY  IP CONSULT TO NEPHROLOGY    Length of Stay:  Expected LOS: - - -  Actual LOS: 2  Discharge Plan: MAXIMINO Grace

## 2021-10-17 NOTE — CONSULTS
Nephrology Consult Note     Al Samuels     [facility]. [facility]              Phone - [phone]   Patient: Jose Grider   YOB: 1955    Date- 10/17/2021  MRN: [id number]             REASON FOR CONSULTATION: Hyponatremia  CONSULTING PHYSICIAN: Dr. Shayna Anderson  ADMIT DATE:10/15/2021 PATIENT PCP:None     IMPRESSION & PLAN:    Hyponatremia suspect secondary to SIADH   Ambulatory dysfunction   Generalized weakness    PLAN-  · Suspect hyponatremia secondary to SIADH  · Serum osmolarity low ,urine osmolarity high, urine sodium is high, serum uric acid is low  · We will plan to give tolvaptan 15 mg one-time dose today  · Continue checking sodium every 6 hours  · Thank you for the consult  · Case was discussed with Dr. Shayna Anderson. Will follow with you     · Active Problems:  ·   Hyponatremia (10/15/2021)  ·   ·   Generalized weakness (10/15/2021)  ·   ·   Unable to walk (10/15/2021)  ·   ·   History of toxic encephalopathy ()  ·   ·   Weakness generalized ()  ·   ·   Tremors of nervous system (10/16/2021)  ·   ·   Lumbosacral radiculopathy due to degenerative joint disease of spine (10/16/2021)  ·   ·   Cervical radiculopathy due to degenerative joint disease of spine (10/16/2021)  ·   ·   Metabolic myopathy (53/81/2203)  ·   ·   Chronic insomnia (10/16/2021)  ·   ·   Depression (10/16/2021)  ·   ·     [] High complexity decision making was performed  [] Patient is at high-risk of decompensation with multiple organ involvement    Subjective:   HPI: Jose Grider is a 61 y.o.  male with no significant past medical history presented to the ED with complaints of ambulatory dysfunction, generalized weakness and tremors. Patient does not take any prescription medication at home. He had a very detailed neurologic work-up done including MRIs of the brain and spinal cord along with lab tests including CK TSH and B12 all came back to within normal range. He has been seen by neurology.   On admission his sodium was low at 131 and it fell down to 125 intermittent 125 today renal consult requested for evaluation of hyponatremia      Review of Systems:       A 11 point review of system was performed today. Pertinent positives and negatives are mentioned in the HPI. The reminder of the ROS is negative and noncontributory. Past Medical History:   Diagnosis Date    History of toxic encephalopathy     Weakness generalized       History reviewed. No pertinent surgical history.    Prior to Admission medications    Not on File     No Known Allergies   Social History     Tobacco Use    Smoking status: Never Smoker    Smokeless tobacco: Never Used   Substance Use Topics    Alcohol use: Not Currently      Family History   Problem Relation Age of Onset    Arthritis-rheumatoid Father     Diabetes Brother         Objective:      Patient Vitals for the past 24 hrs:   Temp Pulse Resp BP SpO2   10/17/21 1124 98.4 °F (36.9 °C) 85 16 (!) 148/66 98 %   10/17/21 0846 98.2 °F (36.8 °C) 83 16 (!) 142/75 98 %   10/17/21 0319 98.4 °F (36.9 °C) 78 18 (!) 147/71 96 %   10/16/21 2330 98 °F (36.7 °C) 76 18 (!) 141/65 98 %   10/16/21 1930 98.1 °F (36.7 °C) 72 18 116/68 97 %   10/16/21 1430 97.9 °F (36.6 °C) 97  125/75 98 %     10/17 0701 - 10/17 1900  In: 150 [P.O.:150]  Out: -   Last 3 Recorded Weights in this Encounter    10/15/21 0631   Weight: 63.5 kg (140 lb)      Physical Exam:  General:Alert, No distress,   Eyes:No scleral icterus, No conjunctival pallor  Neck:Supple,no mass palpable,no thyromegaly  Lungs:Clears to auscultation Bilaterally, normal respiratory effort  CVS:RRR, S1 S2 normal,  No rub,  Abdomen:Soft, Non tender, No hepatosplenomegaly  Extremities: no LE edema  Skin:No rash or lesions, Warm and DRY   Psych: appropriate affect    :  No pena  Musculoskeletal : no redness, no joint tenderness  NEURO: Normal Speech, Non focal deficit      CODE STATUS:  full  Care Plan discussed with:  Patient and his wife Chart reviewed.    y Reviewed previous records   y Discussion with patient and/or family and questions answered       ECG[de-identified] Rev:yes  Xray/CT/US/MRI REV:yes  Lab Data Personally Reviewed: (see below)  Recent Labs     10/17/21  1022 10/16/21  1126 10/15/21  0728   WBC  --   --  7.2   HGB  --   --  14.3   PLT  --   --  253   ANEU  --   --  4.9   * 125* 131*   K 4.1 4.1 3.9   * 105* 113*   BUN 10 14 16   CREA 0.69* 0.63* 0.85   ALT  --   --  32   TBILI  --   --  0.7   AP  --   --  49   CA 8.7 9.1 9.6   MG  --   --  2.5*     Lab Results   Component Value Date/Time    Color YELLOW/STRAW 10/15/2021 08:15 AM    Appearance CLEAR 10/15/2021 08:15 AM    Specific gravity 1.020 10/15/2021 08:15 AM    pH (UA) 7.5 10/15/2021 08:15 AM    Protein Negative 10/15/2021 08:15 AM    Glucose Negative 10/15/2021 08:15 AM    Ketone Negative 10/15/2021 08:15 AM    Bilirubin Negative 10/15/2021 08:15 AM    Urobilinogen 0.2 10/15/2021 08:15 AM    Nitrites Negative 10/15/2021 08:15 AM    Leukocyte Esterase Negative 10/15/2021 08:15 AM    Epithelial cells FEW 10/15/2021 08:15 AM    Bacteria Negative 10/15/2021 08:15 AM    WBC 0-4 10/15/2021 08:15 AM    RBC 0-5 10/15/2021 08:15 AM       No results found for: IRON, FE, TIBC, IBCT, PSAT, FERR  No results found for: CULT  None     Imaging:    Medications list Personally Reviewed   [x]      Yes     []               No    Thank you for allowing us to participate in the care this patient. We will follow patient with you. Signed By: Perez Rebollar [facility]  [facility] [address], [address] [place]  [place], [address]  Phone - [phone]         Fax - [phone] [facility] Office  [address] [address]  Phone - [phone]        Fax - [phone]     [email or web address]

## 2021-10-17 NOTE — PROGRESS NOTES
End of Shift Note    Bedside shift change report given to Viki Pina  (oncoming nurse) by Placido Chou RN (offgoing nurse). Report included the following information SBAR, Kardex, Intake/Output and MAR    Shift worked:  NIGHT   Shift summary and any significant changes:     UNEVENTFUL NIGHT       Concerns for physician to address:  NONE   Zone phone for oncoming shift:   [phone]     Patient Information  Rocio Chacon  61 y.o.  10/15/2021  6:29 AM by Charles Lazo MD. Rocio Chacon was admitted from Home    Problem List  Patient Active Problem List    Diagnosis Date Noted    Tremors of nervous system 10/16/2021    Lumbosacral radiculopathy due to degenerative joint disease of spine 10/16/2021    Cervical radiculopathy due to degenerative joint disease of spine 97/57/8206    Metabolic myopathy 51/85/6406    Chronic insomnia 10/16/2021    Depression 10/16/2021    History of toxic encephalopathy     Weakness generalized     Hyponatremia 10/15/2021    Generalized weakness 10/15/2021    Unable to walk 10/15/2021     Past Medical History:   Diagnosis Date    History of toxic encephalopathy     Weakness generalized        Core Measures: Activity:  Activity Level: Up with Assistance  Number times ambulated in hallways past shift: 0  Number of times OOB to chair past shift: 3    Cardiac:   Cardiac Monitoring: Yes      Cardiac Rhythm: Sinus Rhythm    Access:   Current line(s): PIV     Genitourinary:   Urinary status: voiding    Respiratory:   O2 Device: None (Room air)  Chronic home O2 use?: NO  Incentive spirometer at bedside: NO       GI:  Last Bowel Movement Date: 10/16/21  Current diet:  ADULT DIET Regular  Passing flatus: YES  Tolerating current diet: NO       Pain Management:   Patient states pain is manageable on current regimen: NO    Skin:  Jonathan Score: 20  Interventions: increase time out of bed    Patient Safety:  Fall Score:  Total Score: 3  Interventions: bed/chair alarm, assistive device (walker, cane, etc), gripper socks and pt to call before getting OOB  High Fall Risk: Yes  @Rollbelt  @dexterity to release roll belt  Yes/No ( must document dexterity  here by stating Yes or No here, otherwise this is a restraint and must follow restraint documentation policy.)    DVT prophylaxis:  DVT prophylaxis Med- No  DVT prophylaxis SCD or YISSEL- No     Wounds: (If Applicable)  Wounds- No  Location     Active Consults:  IP CONSULT TO NEUROLOGY  IP CONSULT TO NEPHROLOGY    Length of Stay:  Expected LOS: - - -  Actual LOS: 2  Discharge Plan: No       Asad Schmidt RN

## 2021-10-17 NOTE — PROGRESS NOTES
Hospitalist Progress Note    NAME: Rafael Abad   :  1955   MRN:  [id number]       Assessment / Plan:  Tremors, with reported intermittent shaking in arms and legs  Generalized weakness, POA legs > arms causing inability of walk  Hx of photo developing chemical exposure causing generalized weakness, tremors which resolved after forced CHCF and disability. --Evaluated by telemetry neurologist in the ER . There is concern for ischemia or demyelination or potentially neurodegenerative neuromuscular disease. --MRI of the lumbar spine showed  L5-S1: Normal disc height though diminished disc signal. Right lateral annular  tear. Mild diffuse disc bulging, accentuated in the right lateral and foraminal  regions. No substantial facet arthrosis. No canal stenosis. No substantial  subarticular stenosis. Mild bilateral foraminal stenosis. Anitra Kaur MRI of the cervical spine showed  . Normal cord signal.  2. C5-6 and C6-7 cervical spondylosis as above. There is mild left anterolateral  cord compression at C5-6. There is left subarticular and foraminal stenosis at  C5-6 and C6-7  Neurology input appreciated, additional work-up done  N20 folic acid and TSH were within normal limit  Most likely a component of anxiety, Lexapro added by neurology  OT recommended outpatient rehab but patient would like to go to inpatient rehab  We will consult   Patient EEG per neurology note    Hyponatremia Na 131, worsening  Concern for SIADH most likely due to stress  --Sodium worsening to 125, nephro consulted, tolvaptan given  Repeat BMP  Body mass index is 25.61 kg/m².     Code: full  DVT prophylaxis: lovenox  Surrogate decision maker:  Wife Huber Mae     25.0 - 29.9 Overweight / Body mass index is 25.61 kg/m². Estimated discharge date:   Barriers:  Discussed with wife at bedside  Recommended Disposition:  PT, OT, RN     Subjective:     Chief Complaint / Reason for Physician Visit  Follow-up tremors.   Patient reported not sleeping well discussed with RN events overnight. Review of Systems:  Symptom Y/N Comments  Symptom Y/N Comments   Fever/Chills    Chest Pain n    Poor Appetite    Edema     Cough    Abdominal Pain n    Sputum    Joint Pain     SOB/DOMINGUEZ n   Pruritis/Rash     Nausea/vomit n   Tolerating PT/OT     Diarrhea    Tolerating Diet y    Constipation    Other       Could NOT obtain due to:      Objective:     VITALS:   Last 24hrs VS reviewed since prior progress note. Most recent are:  Patient Vitals for the past 24 hrs:   Temp Pulse Resp BP SpO2   10/17/21 1600  82      10/17/21 1543 98.4 °F (36.9 °C) 83 16 (!) 140/73 98 %   10/17/21 1124 98.4 °F (36.9 °C) 85 16 (!) 148/66 98 %   10/17/21 0846 98.2 °F (36.8 °C) 83 16 (!) 142/75 98 %   10/17/21 0319 98.4 °F (36.9 °C) 78 18 (!) 147/71 96 %   10/16/21 2330 98 °F (36.7 °C) 76 18 (!) 141/65 98 %   10/16/21 1930 98.1 °F (36.7 °C) 72 18 116/68 97 %       Intake/Output Summary (Last 24 hours) at 10/17/2021 1643  Last data filed at 10/17/2021 1002  Gross per 24 hour   Intake 150 ml   Output    Net 150 ml        I had a face to face encounter and independently examined this patient on 10/17/2021, as outlined below:  PHYSICAL EXAM:  General: WD, WN. Alert, cooperative, no acute distress    EENT:  EOMI. Anicteric sclerae. MMM  Resp:  CTA bilaterally, no wheezing or rales. No accessory muscle use  CV:  Regular  rhythm,  No edema  GI:  Soft, Non distended, Non tender. +Bowel sounds  Neurologic:  Alert and oriented X 3, normal speech,   Psych:   Good insight. Not anxious nor agitated  Skin:  No rashes.   No jaundice    Reviewed most current lab test results and cultures  YES  Reviewed most current radiology test results   YES  Review and summation of old records today    NO  Reviewed patient's current orders and MAR    YES  PMH/SH reviewed - no change compared to H&P  ________________________________________________________________________  Care Plan discussed with: Comments   Patient x    Family  x  wife   RN x    Care Manager     Consultant  x  neurology                     Multidiciplinary team rounds were held today with , nursing, pharmacist and clinical coordinator. Patient's plan of care was discussed; medications were reviewed and discharge planning was addressed. ________________________________________________________________________  Total NON critical care TIME: 40 Minutes    Total CRITICAL CARE TIME Spent:   Minutes non procedure based      Comments   >50% of visit spent in counseling and coordination of care     ________________________________________________________________________  Lui Humphries MD     Procedures: see electronic medical records for all procedures/Xrays and details which were not copied into this note but were reviewed prior to creation of Plan. LABS:  I reviewed today's most current labs and imaging studies.   Pertinent labs include:  Recent Labs     10/15/21  0728   WBC 7.2   HGB 14.3   HCT 41.0        Recent Labs     10/17/21  1022 10/16/21  1126 10/15/21  0728   * 125* 131*   K 4.1 4.1 3.9   CL 95* 96* 100   CO2 22 24 28   * 105* 113*   BUN 10 14 16   CREA 0.69* 0.63* 0.85   CA 8.7 9.1 9.6   MG  --   --  2.5*   ALB  --   --  3.8   TBILI  --   --  0.7   ALT  --   --  32       Signed: Lui Humphries MD

## 2021-10-17 NOTE — PROGRESS NOTES
Transition of Care Plan:    RUR: 9%   Disposition:  Home with outpatient therapy vs. IPR (family is requesting pt go to IPR at d/c)  Follow up appointments: PCP, Specialists  DME needed:  Pt does not use any DME at baseline. If pt d/c's home, pt will need a RW and shower chair. Transportation at Discharge:  Spouse vs. BLS  [place] or means to access home:    yes   Medicare Letter:  Needed at d/c  Is patient a BCPI-A Bundle:        Floor CM will provide if needed. If yes, was Bundle Letter given?:     Caregiver Contact:  Genarocaitlin Dexter  [phone]  Discharge Caregiver contacted prior to discharge? CM spoke with pt and pt's wife (via phone) today to discuss d/c.    Reason for Admission:  Generalized Weakness, Unable to Walk & Hyponatremia                     RUR Score:  9%                    Plan for utilizing home health:  tbd        PCP: First and Last name:  None CM will provide provider list.      Name of Practice:    Are you a current patient: Yes/No:    Approximate date of last visit:    Can you participate in a virtual visit with your PCP:                     Current Advanced Directive/Advance Care Plan: DNR    Advance Care Planning     General Advance Care Planning (ACP) Conversation      Date of Conversation: 10/17/21  Conducted with: Patient with Decision Making Capacity    Healthcare Decision Maker:   No healthcare decision makers have been documented.    Click here to complete [address] including selection of the Healthcare Decision Maker Relationship (ie \"Primary\")      Content/Action Overview:   DECLINED ACP conversation - will revisit periodically   Reviewed DNR/DNI and patient confirms current DNR status - completed forms on file (place new order if needed)       Length of Voluntary ACP Conversation in minutes:  <16 minutes (Non-Billable)    Helen Shepherd                        Transition of Care Plan:    Home with outpatient therapy vs. IPR    CM met with pt at bedside as well as spouse via phone to introduce self/role, verify demographics, insurance and PCP. CM also discussed d/c plan. Per patient's spouse, she feels that pt will benefit from IPR at d/c. She reported pt's neurologist stated this was not necessary; however, they feel he would be more motivated if he participated in a program inpatient. Pt's spouse stated she is not able to provide all the care she believes pt requires. Pt is a 60 yo, ,  male who was admitted to [address] [place] [address] on 10/15/21 with the above dxs. Pt does not have a PCP. CM will provide provider list.  Pt does not take any mediations; however, if he requires some at d/c, pt will use the [address]. Pt lives with his spouse in a 2 fl home with [address]. Pt does not use any DME. Pt reported he has not driven since May 9248. Pt needs assistance with his ADL care. Pt denied a hx of HH, SNF or IPR. Pt's spouse will transport at [place]. Pt is currently in the system as a Full Code; however, pt and wife stated they desire a DNR. CM sent msg via [employer] to attending physician. CM will continue to assess for d/c needs. Care Management Interventions  PCP Verified by CM: Yes (Pt does not have a PCP. CM will provide list. )  Palliative Care Criteria Met (RRAT>21 & CHF Dx)?: No  Mode of Transport at Discharge:  Other (see comment) (Pt's spouse vs BLS depending on placement. )  Transition of Care Consult (CM Consult): Discharge Planning  Discharge Durable Medical Equipment: No  Physical Therapy Consult: Yes  Occupational Therapy Consult: Yes  Speech Therapy Consult: Yes  Support Systems: Spouse/Significant Other  Confirm Follow Up Transport: Family  The Patient and/or Patient Representative was Provided with a Choice of Provider and Agrees with the Discharge Plan?: Yes  Name of the Patient Representative Who was Provided with a Choice of Provider and Agrees with the Discharge Plan: Krystyna Serrano  Discharge Location  Discharge Placement: Home with outpatient services    Maryann Albert, MSW  Care Management, [facility]

## 2021-10-17 NOTE — PROGRESS NOTES
Consult  REFERRED BY:  None    CHIEF COMPLAINT: Generalized weakness, tremors      Subjective:     Rocio Chacon is a 61 y.o. 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 Since June he has had increased difficulty with generalized fatigue and weakness, and 10 to 15 pound weight loss, cannot sleep at night, anxious all the time, and looks very anxious and depressed. Patient had MRI of his lumbar spine that shows mild degenerative arthritis and MRI of the cervical spine that shows the same thing, and MRI of the brain was unremarkable. Patient's CPK levels were normal, all of his other blood work was normal including B12, and thyroid etc.  And the patient just looks poorly conditioned and generally weak. He does not have any tremors at all now but is slightly hyperreflexic in appears to be gets very tense anxious and depressed. If no fasciculations and no focal atrophy of his muscles, but his muscle bulk and tone is decreased throughout. No trouble with his cranial nerves, he did not appear to have any major limitation of his cervical or lumbar spine. His laboratory studies are otherwise unremarkable except for borderline elevated blood sugar. He was taking no medications prior to admission. Patient found to have a slightly low sodium of 129, which is probably clinically insignificant, but minimal see him today, to further evaluate that. All his other blood studies and neuromuscular studies are normal so far. His MRI scans do not show any clear recommendation to explain his progress.   I discussed his condition with the patient and his wife in detail, and they wanted inpatient rehab but he is already been screened for that and not a candidate, so we will need outpatient therapy to have a  talk to her because she wants to put them in a skilled nursing facility years she has a pay for herself. Past Medical History:   Diagnosis Date    History of toxic encephalopathy     Weakness generalized       History reviewed. No pertinent surgical history. Family History   Problem Relation Age of Onset    Arthritis-rheumatoid Father     Diabetes Brother       Social History     Tobacco Use    Smoking status: Never Smoker    Smokeless tobacco: Never Used   Substance Use Topics    Alcohol use: Not Currently         Current Facility-Administered Medications:     melatonin tablet 3 mg, 3 mg, Oral, DAILY, Dennise Arboleda MD    escitalopram oxalate (LEXAPRO) tablet 5 mg, 5 mg, Oral, DAILY WITH DINNER, Candi Aggarwal MD, 5 mg at 10/17/21 1601    enoxaparin (LOVENOX) injection 40 mg, 40 mg, SubCUTAneous, Q24H, Geri Degroot MD, 40 mg at 10/17/21 1602    LORazepam (ATIVAN) injection 1 mg, 1 mg, IntraVENous, Q6H PRN, Geri Degroot MD    acetaminophen (TYLENOL) tablet 650 mg, 650 mg, Oral, Q4H PRN, 650 mg at 10/17/21 1338 **OR** acetaminophen (TYLENOL) solution 650 mg, 650 mg, Per NG tube, Q4H PRN **OR** acetaminophen (TYLENOL) suppository 650 mg, 650 mg, Rectal, Q4H PRN, Salabao, Pamela, NP        No Known Allergies   MRI Results (most recent):  Results from Hospital Encounter encounter on 10/15/21    MRI LUMB SPINE W WO CONT    Narrative  EXAM: MRI LUMB SPINE W WO CONT    INDICATION: low back pain, unable to walk, b/l arm and leg weakness. COMPARISON: None    TECHNIQUE: MR imaging of the lumbar spine was performed using the following  sequences: sagittal T1, T2, STIR;  axial T1, T2 prior to and following contrast  administration. CONTRAST: 13 mL of ProHance.     FINDINGS:    Conus position, morphology, signal and enhancement appear normal. No intraspinal  or paraspinal soft tissue mass or enhancement abnormality is shown.    There is 4 mm anterolisthesis at L5-S1. Alignment is otherwise anatomic. Moderate right and mild left SI joint osteoarthrosis is noted in the partly  visualized superior sacrum and SI joints. Vertebral body heights are normal. Numerous hemangiomas are noted in the lumbar  and lower thoracic spine including a large hemangioma replacing the L2 vertebral  body. Bone signal is otherwise normal.    Lower thoracic spine: No herniation or stenosis. L1-L2: No herniation or stenosis. L2-L3: No herniation or stenosis. L3-L4: No herniation or stenosis. L4-L5: No herniation or stenosis. L5-S1: Normal disc height though diminished disc signal. Right lateral annular  tear. Mild diffuse disc bulging, accentuated in the right lateral and foraminal  regions. No substantial facet arthrosis. No canal stenosis. No substantial  subarticular stenosis. Mild bilateral foraminal stenosis. .    Impression  L5-S1 spondylosis as above. Normal-appearing distal cord and conus. Multiple hemangiomas with diffuse vertebral body replacement and L2. Results from [place] encounter on 10/15/21    MRI LUMB SPINE W WO CONT    Narrative  EXAM: MRI LUMB SPINE W WO CONT    INDICATION: low back pain, unable to walk, b/l arm and leg weakness. COMPARISON: None    TECHNIQUE: MR imaging of the lumbar spine was performed using the following  sequences: sagittal T1, T2, STIR;  axial T1, T2 prior to and following contrast  administration. CONTRAST: 13 mL of ProHance. FINDINGS:    Conus position, morphology, signal and enhancement appear normal. No intraspinal  or paraspinal soft tissue mass or enhancement abnormality is shown. There is 4 mm anterolisthesis at L5-S1. Alignment is otherwise anatomic. Moderate right and mild left SI joint osteoarthrosis is noted in the partly  visualized superior sacrum and SI joints.     Vertebral body heights are normal. Numerous hemangiomas are noted in the lumbar  and lower thoracic spine including a large hemangioma replacing the L2 vertebral  body. Bone signal is otherwise normal.    Lower thoracic spine: No herniation or stenosis. L1-L2: No herniation or stenosis. L2-L3: No herniation or stenosis. L3-L4: No herniation or stenosis. L4-L5: No herniation or stenosis. L5-S1: Normal disc height though diminished disc signal. Right lateral annular  tear. Mild diffuse disc bulging, accentuated in the right lateral and foraminal  regions. No substantial facet arthrosis. No canal stenosis. No substantial  subarticular stenosis. Mild bilateral foraminal stenosis. .    Impression  L5-S1 spondylosis as above. Normal-appearing distal cord and conus. Multiple hemangiomas with diffuse vertebral body replacement and L2. Review of Systems:  A comprehensive review of systems was negative except for: Constitutional: positive for fatigue and malaise  Musculoskeletal: positive for myalgias, arthralgias, stiff joints, neck pain, back pain and muscle weakness  Neurological: positive for weakness  Behvioral/Psych: positive for anxiety and depression   Vitals:    10/17/21 0846 10/17/21 1124 10/17/21 1543 10/17/21 1600   BP: (!) 142/75 (!) 148/66 (!) 140/73    Pulse: 83 85 83 82   Resp: 16 16 16    Temp: 98.2 °F (36.8 °C) 98.4 °F (36.9 °C) 98.4 °F (36.9 °C)    SpO2: 98% 98% 98%    Weight:       Height:         Objective:     I      NEUROLOGICAL EXAM:    Appearance: The patient is poorly developed and nourished, provides a coherent history and is in no acute distress. Mental Status: Oriented to time, place and person, and the president, cognitive function is normal and speech is fluent and no aphasia or dysarthria. Mood and affect appropriate very depressed and anxious. Cranial Nerves:   Intact visual fields. Fundi are benign, disc are flat, no lesions seen on funduscopy. JESUSITA, EOM's full, no nystagmus, no ptosis. Facial sensation is normal. Corneal reflexes are not tested. Facial movement is symmetric. Hearing is normal bilaterally. Palate is midline with normal sternocleidomastoid and trapezius muscles are normal. Tongue is midline. Neck without meningismus or bruits  Temporal arteries are not tender or enlarged  TMJ areas are not tender on palpation   Motor:  4/5 strength in upper and lower proximal and distal muscles. Normal bulk and tone. No fasciculations. Rapid alternating movement is symmetric and intact bilaterally   Reflexes:   Deep tendon reflexes 2+/4 and symmetrical.  No babinski or clonus present   Sensory:   Normal to touch, pinprick and vibration and temperature. DSS is intact   Gait:  Abnormal gait for patient's age as he is generally weak and probably needs a cane or walker due to his poor conditioning. Tremor:   No tremor noted. Cerebellar:  No abnormal cerebellar signs present on Romberg and tandem testing and finger-nose-finger exam.   Neurovascular:  Normal heart sounds and regular rhythm, peripheral pulses intact, and no carotid bruits. Assessment:       ICD-10-CM ICD-9-CM    1. Ambulatory dysfunction  R26.2 719.7    2. Weakness  R53.1 780.79    3. Cervical radiculopathy due to degenerative joint disease of spine  M47.22 721.0    4. Generalized weakness  R53.1 780.79    5. History of toxic encephalopathy  Z86.69 V12.49    6. Lumbosacral radiculopathy due to degenerative joint disease of spine  M47.27 722.52    7. Metabolic myopathy  O37.3 581.55     G73.7     8. Tremors of nervous system  R25.1 781.0    9.  Weakness generalized  R53.1 780.79      Active Problems:    Hyponatremia (10/15/2021)      Generalized weakness (10/15/2021)      Unable to walk (10/15/2021)      History of toxic encephalopathy ()      Weakness generalized ()      Tremors of nervous system (10/16/2021)      Lumbosacral radiculopathy due to degenerative joint disease of spine (10/16/2021)      Cervical radiculopathy due to degenerative joint disease of spine (10/16/2021) Metabolic myopathy (46/07/1534)      Chronic insomnia (10/16/2021)      Depression (10/16/2021)        Plan:     Patient with normal MRI scans except for minimal arthritis of the cervical and lumbar spine and a normal MRI of the brain for his age. All his lab studies are relatively normal including CPK levels, thyroid, and B12 levels, and the rest of his labs, and he should be able to go home with some outpatient therapy and generalized conditioning would probably benefit from a mild antidepressant to help him sleep at night so we will start some Lexapro. He can follow-up in the office as an outpatient EEG for his tremors, but they are gone now probably more anxiety related, and he can follow-up in our office for possible EMG studies if his sensation of weakness persists. Patient found to have a slightly low sodium of 129, which is probably clinically insignificant, but minimal see him today, to further evaluate that. All his other blood studies and neuromuscular studies are normal so far. His MRI scans do not show any clear recommendation to explain his progress. I discussed his condition with the patient and his wife in detail, and they wanted inpatient rehab but he is already been screened for that and not a candidate, so we will need outpatient therapy to have a  talk to her because she wants to put them in a skilled nursing facility years she has a pay for herself. He may need psychiatric referral.  Continue excellent medical care as you are, we will follow while here.     Signed By: Ashtyn Craven MD     October 17, 2021       CC: None  FAX: None

## 2021-10-17 NOTE — PROGRESS NOTES
Spiritual Care Assessment/Progress Note  [facility]      NAME: Marisa Naik      MRN: [id number]  AGE: 61 y.o.  SEX: male  Samaritan Affiliation: Yazidism   Language: English     10/17/2021     Total Time (in minutes): 15     Spiritual Assessment begun in [facility] NEUROSCIENCE TELEMETRY through conversation with:         [x]Patient        [] Family    [] Friend(s)        Reason for Consult: Request by patient     Spiritual beliefs: (Please include comment if needed)     [x] Identifies with a michell tradition:         [] Supported by a michell community:            [] Claims no spiritual orientation:           [] Seeking spiritual identity:                [] Adheres to an individual form of spirituality:           [] Not able to assess:                           Identified resources for coping:      [x] Prayer                               [] Music                  [] Guided Imagery     [x] Family/friends                 [] Pet visits     [] Devotional reading                         [] Unknown     [] Other:                                               Interventions offered during this visit: (See comments for more details)    Patient Interventions: Affirmation of emotions/emotional suffering, Affirmation of michell, Catharsis/review of pertinent events in supportive environment, Iconic (affirming the presence of God/Higher Power), Initial/Spiritual assessment, patient floor, Normalization of emotional/spiritual concerns, Prayer (actual), Prayer (assurance of)           Plan of Care:     [x] Support spiritual and/or cultural needs    [] Support AMD and/or advance care planning process      [] Support grieving process   [] Coordinate Rites and/or Rituals    [] Coordination with community clergy   [] No spiritual needs identified at this time   [] Detailed Plan of Care below (See Comments)  [] Make referral to Music Therapy  [] Make referral to Pet Therapy     [] Make referral to Addiction services  [] Make referral to [facility]  [] Make referral to Spiritual Care Partner  [] No future visits requested        [x] Follow up upon further referrals     Comments:  Responded to patient request for visit on Neuro unit. No family/friends present. Provided bedside presence and supportive listening as patient spoke about events and symptoms leading to hospitalization. He seems concerned about the uncertainty of a clear diagnosis. He was calm and reserved during visit. Offered prayer and advised him of ongoing availability of pastoral support.        CORINA Dick, [facility], Staff [address]    [address] Paging Service  [phone]

## 2021-10-17 NOTE — PROGRESS NOTES
Transition of Care Plan:     RUR: 9%   Disposition:  IPR - referrals sent to [facility] and [facility]  Follow up appointments: PCP, Specialists  DME needed:  Defer to IPR   Transportation at Discharge: Azarlan Nicko or means to access home:    yes  IM Medicare Letter:  Needed at d/c  Is patient a BCPI-A Bundle:  Floor CM will provide if needed. If yes, was Bundle Letter given?:     Caregiver Contact:  Jannacara Diop  [phone]  Discharge Caregiver contacted prior to discharge? CM spoke with pt and pt's wife (via phone) today to discuss d/c. Initial Note: CM phone pt's wife regarding dispo. Family prefers inpt rehab to out pt rehab as wife feels like she can not safely maintain the pt in the home and reports the home has several KAYLYNN which she would have difficulty getting the patient up and down until his mobility improves. Freedom of Choice provided. Family identified [facility] ad [facility] as options. Referrals sent via AllScripts. Unit CM to follow for dc planning.      JESSICA Dickson  Care Manager, [address]

## 2021-10-17 NOTE — PROGRESS NOTES
Problem: Patient Education: Go to Patient Education Activity  Goal: Patient/Family Education  Outcome: Progressing Towards Goal     Problem: TIA/CVA Stroke: 0-24 hours  Goal: Off Pathway (Use only if patient is Off Pathway)  Outcome: Progressing Towards Goal  Goal: Activity/Safety  Outcome: Progressing Towards Goal  Goal: Consults, if ordered  Outcome: Progressing Towards Goal  Goal: Diagnostic Test/Procedures  Outcome: Progressing Towards Goal  Goal: Nutrition/Diet  Outcome: Progressing Towards Goal  Goal: Discharge Planning  Outcome: Progressing Towards Goal  Goal: Medications  Outcome: Progressing Towards Goal  Goal: Respiratory  Outcome: Progressing Towards Goal  Goal: Treatments/Interventions/Procedures  Outcome: Progressing Towards Goal  Goal: Minimize risk of bleeding post-thrombolytic infusion  Outcome: Progressing Towards Goal  Goal: Monitor for complications post-thrombolytic infusion  Outcome: Progressing Towards Goal  Goal: Psychosocial  Outcome: Progressing Towards Goal  Goal: *Hemodynamically stable  Outcome: Progressing Towards Goal  Goal: *Neurologically stable  Description: Absence of additional neurological deficits    Outcome: Progressing Towards Goal  Goal: *Verbalizes anxiety and depression are reduced or absent  Outcome: Progressing Towards Goal  Goal: *Absence of Signs of Aspiration on Current Diet  Outcome: Progressing Towards Goal  Goal: *Absence of deep venous thrombosis signs and symptoms(Stroke Metric)  Outcome: Progressing Towards Goal  Goal: *Ability to perform ADLs and demonstrates progressive mobility and function  Outcome: Progressing Towards Goal  Goal: *Stroke education started(Stroke Metric)  Outcome: Progressing Towards Goal  Goal: *Dysphagia screen performed(Stroke Metric)  Outcome: Progressing Towards Goal  Goal: *Rehab consulted(Stroke Metric)  Outcome: Progressing Towards Goal     Problem: TIA/CVA Stroke: Day 2 Until Discharge  Goal: Off Pathway (Use only if patient is Off Pathway)  Outcome: Progressing Towards Goal  Goal: Activity/Safety  Outcome: Progressing Towards Goal  Goal: Diagnostic Test/Procedures  Outcome: Progressing Towards Goal  Goal: Nutrition/Diet  Outcome: Progressing Towards Goal  Goal: Discharge Planning  Outcome: Progressing Towards Goal  Goal: Medications  Outcome: Progressing Towards Goal  Goal: Respiratory  Outcome: Progressing Towards Goal  Goal: Treatments/Interventions/Procedures  Outcome: Progressing Towards Goal  Goal: Psychosocial  Outcome: Progressing Towards Goal  Goal: *Verbalizes anxiety and depression are reduced or absent  Outcome: Progressing Towards Goal  Goal: *Absence of aspiration  Outcome: Progressing Towards Goal  Goal: *Absence of deep venous thrombosis signs and symptoms(Stroke Metric)  Outcome: Progressing Towards Goal  Goal: *Optimal pain control at patient's stated goal  Outcome: Progressing Towards Goal  Goal: *Tolerating diet  Outcome: Progressing Towards Goal  Goal: *Ability to perform ADLs and demonstrates progressive mobility and function  Outcome: Progressing Towards Goal  Goal: *Stroke education continued(Stroke Metric)  Outcome: Progressing Towards Goal     Problem: Ischemic Stroke: Discharge Outcomes  Goal: *Verbalizes anxiety and depression are reduced or absent  Outcome: Progressing Towards Goal  Goal: *Verbalize understanding of risk factor modification(Stroke Metric)  Outcome: Progressing Towards Goal  Goal: *Hemodynamically stable  Outcome: Progressing Towards Goal  Goal: *Absence of aspiration pneumonia  Outcome: Progressing Towards Goal  Goal: *Aware of needed dietary changes  Outcome: Progressing Towards Goal  Goal: *Verbalize understanding of prescribed medications including anti-coagulants, anti-lipid, and/or anti-platelets(Stroke Metric)  Outcome: Progressing Towards Goal  Goal: *Tolerating diet  Outcome: Progressing Towards Goal  Goal: *Aware of follow-up diagnostics related to anticoagulants  Outcome: Progressing Towards Goal  Goal: *Ability to perform ADLs and demonstrates progressive mobility and function  Outcome: Progressing Towards Goal  Goal: *Absence of DVT(Stroke Metric)  Outcome: Progressing Towards Goal  Goal: *Absence of aspiration  Outcome: Progressing Towards Goal  Goal: *Optimal pain control at patient's stated goal  Outcome: Progressing Towards Goal  Goal: *Home safety concerns addressed  Outcome: Progressing Towards Goal  Goal: *Describes available resources and support systems  Outcome: Progressing Towards Goal  Goal: *Verbalizes understanding of activation of EMS(911) for stroke symptoms(Stroke Metric)  Outcome: Progressing Towards Goal  Goal: *Understands and describes signs and symptoms to report to providers(Stroke Metric)  Outcome: Progressing Towards Goal  Goal: *Neurolgocially stable (absence of additional neurological deficits)  Outcome: Progressing Towards Goal  Goal: *Verbalizes importance of follow-up with primary care physician(Stroke Metric)  Outcome: Progressing Towards Goal  Goal: *Smoking cessation discussed,if applicable(Stroke Metric)  Outcome: Progressing Towards Goal  Goal: *Depression screening completed(Stroke Metric)  Outcome: Progressing Towards Goal     Problem: Falls - Risk of  Goal: *Absence of Falls  Description: Document Heather Fall Risk and appropriate interventions in the flowsheet.   Outcome: Progressing Towards Goal  Note: Fall Risk Interventions:  Mobility Interventions: Bed/chair exit alarm              Elimination Interventions: Call light in reach    History of Falls Interventions: Bed/chair exit alarm         Problem: Patient Education: Go to Patient Education Activity  Goal: Patient/Family Education  Outcome: Progressing Towards Goal     Problem: Seizure Disorder (Adult)  Goal: *STG: Remains free of seizure activity  Outcome: Progressing Towards Goal  Goal: *STG: Maintains lab values within therapeutic range  Outcome: Progressing Towards Goal  Goal: *STG/LTG: Complies with medication therapy  Outcome: Progressing Towards Goal  Goal: *STG: Remains free of injury during seizure activity  Outcome: Progressing Towards Goal  Goal: *STG: Remains safe in hospital  Outcome: Progressing Towards Goal  Goal: Interventions  Outcome: Progressing Towards Goal     Problem: Patient Education: Go to Patient Education Activity  Goal: Patient/Family Education  Outcome: Progressing Towards Goal     Problem: Seizure Disorder (Adult)  Goal: *STG: Remains free of seizure activity  Outcome: Progressing Towards Goal  Goal: *STG: Maintains lab values within therapeutic range  Outcome: Progressing Towards Goal  Goal: *STG/LTG: Complies with medication therapy  Outcome: Progressing Towards Goal  Goal: *STG: Remains free of injury during seizure activity  Outcome: Progressing Towards Goal  Goal: *STG: Remains safe in hospital  Outcome: Progressing Towards Goal  Goal: Interventions  Outcome: Progressing Towards Goal     Problem: Patient Education: Go to Patient Education Activity  Goal: Patient/Family Education  Outcome: Progressing Towards Goal

## 2021-10-18 VITALS
OXYGEN SATURATION: 96 % | BODY MASS INDEX: 25.76 KG/M2 | DIASTOLIC BLOOD PRESSURE: 78 MMHG | HEART RATE: 84 BPM | SYSTOLIC BLOOD PRESSURE: 146 MMHG | HEIGHT: 62 IN | TEMPERATURE: 98.6 F | WEIGHT: 140 LBS | RESPIRATION RATE: 16 BRPM

## 2021-10-18 LAB
ANA SER QL: NEGATIVE
ANION GAP SERPL CALC-SCNC: 6 MMOL/L (ref 5–15)
ANION GAP SERPL CALC-SCNC: 6 MMOL/L (ref 5–15)
BUN SERPL-MCNC: 16 MG/DL (ref 6–20)
BUN SERPL-MCNC: 16 MG/DL (ref 6–20)
BUN/CREAT SERPL: 19 (ref 12–20)
BUN/CREAT SERPL: 20 (ref 12–20)
CALCIUM SERPL-MCNC: 9.3 MG/DL (ref 8.5–10.1)
CALCIUM SERPL-MCNC: 9.6 MG/DL (ref 8.5–10.1)
CHLORIDE SERPL-SCNC: 102 MMOL/L (ref 97–108)
CHLORIDE SERPL-SCNC: 104 MMOL/L (ref 97–108)
CO2 SERPL-SCNC: 24 MMOL/L (ref 21–32)
CO2 SERPL-SCNC: 24 MMOL/L (ref 21–32)
COMMENT, HOLDF: NORMAL
COVID-19 RAPID TEST, COVR: NOT DETECTED
CREAT SERPL-MCNC: 0.82 MG/DL (ref 0.7–1.3)
CREAT SERPL-MCNC: 0.84 MG/DL (ref 0.7–1.3)
GLUCOSE SERPL-MCNC: 114 MG/DL (ref 65–100)
GLUCOSE SERPL-MCNC: 124 MG/DL (ref 65–100)
POTASSIUM SERPL-SCNC: 4.1 MMOL/L (ref 3.5–5.1)
POTASSIUM SERPL-SCNC: 4.1 MMOL/L (ref 3.5–5.1)
SAMPLES BEING HELD,HOLD: NORMAL
SODIUM SERPL-SCNC: 132 MMOL/L (ref 136–145)
SODIUM SERPL-SCNC: 133 MMOL/L (ref 136–145)
SODIUM SERPL-SCNC: 133 MMOL/L (ref 136–145)
SODIUM SERPL-SCNC: 134 MMOL/L (ref 136–145)
SOURCE, COVRS: NORMAL

## 2021-10-18 PROCEDURE — 74011250637 HC RX REV CODE- 250/637: Performed by: NURSE PRACTITIONER

## 2021-10-18 PROCEDURE — 97530 THERAPEUTIC ACTIVITIES: CPT

## 2021-10-18 PROCEDURE — 97110 THERAPEUTIC EXERCISES: CPT | Performed by: PHYSICAL THERAPIST

## 2021-10-18 PROCEDURE — 80048 BASIC METABOLIC PNL TOTAL CA: CPT

## 2021-10-18 PROCEDURE — 97110 THERAPEUTIC EXERCISES: CPT

## 2021-10-18 PROCEDURE — 84295 ASSAY OF SERUM SODIUM: CPT

## 2021-10-18 PROCEDURE — 87635 SARS-COV-2 COVID-19 AMP PRB: CPT

## 2021-10-18 PROCEDURE — 99233 SBSQ HOSP IP/OBS HIGH 50: CPT | Performed by: PSYCHIATRY & NEUROLOGY

## 2021-10-18 PROCEDURE — 36415 COLL VENOUS BLD VENIPUNCTURE: CPT

## 2021-10-18 PROCEDURE — 92522 EVALUATE SPEECH PRODUCTION: CPT

## 2021-10-18 PROCEDURE — 97116 GAIT TRAINING THERAPY: CPT | Performed by: PHYSICAL THERAPIST

## 2021-10-18 RX ORDER — LANOLIN ALCOHOL/MO/W.PET/CERES
1.5 CREAM (GRAM) TOPICAL
Status: DISCONTINUED | OUTPATIENT
Start: 2021-10-18 | End: 2021-10-18 | Stop reason: HOSPADM

## 2021-10-18 RX ORDER — ESCITALOPRAM OXALATE 5 MG/1
5 TABLET ORAL
Qty: 30 TABLET | Refills: 1 | Status: SHIPPED
Start: 2021-10-18 | End: 2021-11-17

## 2021-10-18 RX ADMIN — ACETAMINOPHEN 650 MG: 325 TABLET ORAL at 07:04

## 2021-10-18 RX ADMIN — ACETAMINOPHEN 650 MG: 325 TABLET ORAL at 11:18

## 2021-10-18 NOTE — PROGRESS NOTES
Problem: Patient Education: Go to Patient Education Activity  Goal: Patient/Family Education  Outcome: Progressing Towards Goal     Problem: TIA/CVA Stroke: 0-24 hours  Goal: Off Pathway (Use only if patient is Off Pathway)  Outcome: Progressing Towards Goal  Goal: Activity/Safety  Outcome: Progressing Towards Goal  Goal: Consults, if ordered  Outcome: Progressing Towards Goal  Goal: Diagnostic Test/Procedures  Outcome: Progressing Towards Goal  Goal: Nutrition/Diet  Outcome: Progressing Towards Goal  Goal: Discharge Planning  Outcome: Progressing Towards Goal  Goal: Medications  Outcome: Progressing Towards Goal  Goal: Respiratory  Outcome: Progressing Towards Goal  Goal: Treatments/Interventions/Procedures  Outcome: Progressing Towards Goal  Goal: Minimize risk of bleeding post-thrombolytic infusion  Outcome: Progressing Towards Goal  Goal: Monitor for complications post-thrombolytic infusion  Outcome: Progressing Towards Goal  Goal: Psychosocial  Outcome: Progressing Towards Goal  Goal: *Hemodynamically stable  Outcome: Progressing Towards Goal  Goal: *Neurologically stable  Description: Absence of additional neurological deficits    Outcome: Progressing Towards Goal  Goal: *Verbalizes anxiety and depression are reduced or absent  Outcome: Progressing Towards Goal  Goal: *Absence of Signs of Aspiration on Current Diet  Outcome: Progressing Towards Goal  Goal: *Absence of deep venous thrombosis signs and symptoms(Stroke Metric)  Outcome: Progressing Towards Goal  Goal: *Ability to perform ADLs and demonstrates progressive mobility and function  Outcome: Progressing Towards Goal  Goal: *Stroke education started(Stroke Metric)  Outcome: Progressing Towards Goal  Goal: *Dysphagia screen performed(Stroke Metric)  Outcome: Progressing Towards Goal  Goal: *Rehab consulted(Stroke Metric)  Outcome: Progressing Towards Goal     Problem: TIA/CVA Stroke: Day 2 Until Discharge  Goal: Off Pathway (Use only if patient is Off Pathway)  Outcome: Progressing Towards Goal  Goal: Activity/Safety  Outcome: Progressing Towards Goal  Goal: Diagnostic Test/Procedures  Outcome: Progressing Towards Goal  Goal: Nutrition/Diet  Outcome: Progressing Towards Goal  Goal: Discharge Planning  Outcome: Progressing Towards Goal  Goal: Medications  Outcome: Progressing Towards Goal  Goal: Respiratory  Outcome: Progressing Towards Goal  Goal: Treatments/Interventions/Procedures  Outcome: Progressing Towards Goal  Goal: Psychosocial  Outcome: Progressing Towards Goal  Goal: *Verbalizes anxiety and depression are reduced or absent  Outcome: Progressing Towards Goal  Goal: *Absence of aspiration  Outcome: Progressing Towards Goal  Goal: *Absence of deep venous thrombosis signs and symptoms(Stroke Metric)  Outcome: Progressing Towards Goal  Goal: *Optimal pain control at patient's stated goal  Outcome: Progressing Towards Goal  Goal: *Tolerating diet  Outcome: Progressing Towards Goal  Goal: *Ability to perform ADLs and demonstrates progressive mobility and function  Outcome: Progressing Towards Goal  Goal: *Stroke education continued(Stroke Metric)  Outcome: Progressing Towards Goal     Problem: Ischemic Stroke: Discharge Outcomes  Goal: *Verbalizes anxiety and depression are reduced or absent  Outcome: Progressing Towards Goal  Goal: *Verbalize understanding of risk factor modification(Stroke Metric)  Outcome: Progressing Towards Goal  Goal: *Hemodynamically stable  Outcome: Progressing Towards Goal  Goal: *Absence of aspiration pneumonia  Outcome: Progressing Towards Goal  Goal: *Aware of needed dietary changes  Outcome: Progressing Towards Goal  Goal: *Verbalize understanding of prescribed medications including anti-coagulants, anti-lipid, and/or anti-platelets(Stroke Metric)  Outcome: Progressing Towards Goal  Goal: *Tolerating diet  Outcome: Progressing Towards Goal  Goal: *Aware of follow-up diagnostics related to anticoagulants  Outcome: Progressing Towards Goal  Goal: *Ability to perform ADLs and demonstrates progressive mobility and function  Outcome: Progressing Towards Goal  Goal: *Absence of DVT(Stroke Metric)  Outcome: Progressing Towards Goal  Goal: *Absence of aspiration  Outcome: Progressing Towards Goal  Goal: *Optimal pain control at patient's stated goal  Outcome: Progressing Towards Goal  Goal: *Home safety concerns addressed  Outcome: Progressing Towards Goal  Goal: *Describes available resources and support systems  Outcome: Progressing Towards Goal  Goal: *Verbalizes understanding of activation of EMS(911) for stroke symptoms(Stroke Metric)  Outcome: Progressing Towards Goal  Goal: *Understands and describes signs and symptoms to report to providers(Stroke Metric)  Outcome: Progressing Towards Goal  Goal: *Neurolgocially stable (absence of additional neurological deficits)  Outcome: Progressing Towards Goal  Goal: *Verbalizes importance of follow-up with primary care physician(Stroke Metric)  Outcome: Progressing Towards Goal  Goal: *Smoking cessation discussed,if applicable(Stroke Metric)  Outcome: Progressing Towards Goal  Goal: *Depression screening completed(Stroke Metric)  Outcome: Progressing Towards Goal     Problem: Falls - Risk of  Goal: *Absence of Falls  Description: Document Heather Fall Risk and appropriate interventions in the flowsheet.   Outcome: Progressing Towards Goal  Note: Fall Risk Interventions:  Mobility Interventions: Bed/chair exit alarm         Medication Interventions: Bed/chair exit alarm    Elimination Interventions: Call light in reach    History of Falls Interventions: Bed/chair exit alarm         Problem: Patient Education: Go to Patient Education Activity  Goal: Patient/Family Education  Outcome: Progressing Towards Goal     Problem: Seizure Disorder (Adult)  Goal: *STG: Remains free of seizure activity  Outcome: Progressing Towards Goal  Goal: *STG: Maintains lab values within therapeutic range  Outcome: Progressing Towards Goal  Goal: *STG/LTG: Complies with medication therapy  Outcome: Progressing Towards Goal  Goal: *STG: Remains free of injury during seizure activity  Outcome: Progressing Towards Goal  Goal: *STG: Remains safe in hospital  Outcome: Progressing Towards Goal  Goal: Interventions  Outcome: Progressing Towards Goal     Problem: Patient Education: Go to Patient Education Activity  Goal: Patient/Family Education  Outcome: Progressing Towards Goal     Problem: Seizure Disorder (Adult)  Goal: *STG: Remains free of seizure activity  Outcome: Progressing Towards Goal  Goal: *STG: Maintains lab values within therapeutic range  Outcome: Progressing Towards Goal  Goal: *STG/LTG: Complies with medication therapy  Outcome: Progressing Towards Goal  Goal: *STG: Remains free of injury during seizure activity  Outcome: Progressing Towards Goal  Goal: *STG: Remains safe in hospital  Outcome: Progressing Towards Goal  Goal: Interventions  Outcome: Progressing Towards Goal     Problem: Patient Education: Go to Patient Education Activity  Goal: Patient/Family Education  Outcome: Progressing Towards Goal

## 2021-10-18 NOTE — PROGRESS NOTES
Transition of Care Plan:     RUR: 9%   Disposition:  SNF - [facility]  Room #: 67A  Phone # for Report: [phone]  Fax #: [phone]  Follow up appointments: Defer to SNF  DME needed:  Defer to SNF   Transportation at Discharge: [facility] arranged for 430 PM, PCS completed and placed on chart  Keys or means to access home:    yes  IM Medicare Letter:  Medicare pt has received, reviewed, and signed 2nd IM letter informing them of their right to appeal the discharge. Signed copy has been placed on pt bedside chart. Is patient a BCPI-A Bundle:  No              If yes, was Bundle Letter given?:  n/a   Caregiver Contact:  Minedeon Warner Mally  [phone]  Discharge Caregiver contacted prior to discharge? CM spoke with pt and pt's wife today to discuss d/c. Initial Note: CM rec'd a phone call from [address] [place] [address] Dr Viki Mccarthy indicating that pt was not appropriate for IPR due to his high level of need. CM met with pt and pt's wife at bedside and family decided to pursue SNF. Referral sent via [employer] to [facility] and [address] [place] [address] and [facility]. [facility] accepted - family confirmed. CM spoke with [facility] rep [facility] [phone] to confirm admission. [facility] transport arranged for 4:30pm. Bedside nurse notified - rapid COVID test requested. Proof of COVID vaccination rec'd from wife and faxed to [facility]DRA RIVERA. CM is cleared for dc from a CM standpoint. Transition of Care Plan to SNF/Rehab    Communication to Patient/Family:  The Patient and/or patient representative was provided with a choice of provider and agrees  with the discharge plan. Yes [x] No []    A Freedom of choice list was provided with basic dialogue that supports the patient's individualized plan of care/goals and shares the quality data associated with the providers. Yes [x] No []    SNF/Rehab Transition:  Patient has been accepted to [facility] SNF/Rehab and meets criteria for admission.    Patient will transported by [facility] and expected to leave at 4:30pm.    Communication to SNF/Rehab:  Bedside RN, has been notified to update the transition plan to the facility and call report [phone]. Discharge information has been updated on the AVS. And communicated to facility via [employer]/All Scripts, or CC link. Discharge instructions to be fax'd to facility at [phone]. Nursing Please include all hard scripts for controlled substances, med rec and dc summary, and AVS in packet. Reviewed and confirmed with facility, [facility], can manage the patient care needs for the following:     Lesia Wood with (X) only those applicable:  Medication:  []Medications are available at the facility  []IV Antibiotics    []Controlled Substance  hard copies available sent. []Weekly Labs    Equipment:  []CPAP/BiPAP  []Wound Vacuum  []Slater or Urinary Device  []PICC/Central Line  []Nebulizer  []Ventilator    Treatment:  []Isolation (for MRSA, VRE, etc.)  []Surgical Drain Management  []Tracheostomy Care  []Dressing Changes  []Dialysis with transportation  []PEG Care  []Oxygen  []Daily Weights for Heart Failure    Dietary:  []Any diet limitations  []Tube Feedings   []Total Parenteral Management (TPN)    Financial Resources:  []Medicaid Application Completed    []UAI Completed and copy given to pt/family  and copy given to pt/family  []A screening has previously been completed. []Level II Completed    [x] Private pay individual who will not become   financially eligible for Medicaid within 6 months from admission to a [address] [place] [address] facility. [] Individual refused to have screening conducted. []Medicaid Application Completed    []The screening denied because it was determined individual did not need/did not qualify for nursing facility level of care. [] Out of state residents seeking direct admission to a [address] facility.   [] Individuals who are inpatients of an out of state hospital, or in state or out of state veterans/ hospital and seek direct admission to a [address] facility  [] Individuals who are pateints or residents of a state owned/operated facility that is licensed by [address] ([place]) and seek direct admission to [address] facility  [] A screening not required for enrollment in [facility] [address] services as set out in [address] [place] [address] 91-  [] [facility] staff shall perform screenings of the [facility] clients. Advanced Care Plan:  []Surrogate Decision Maker of Care  []POA  [x]Communicated Code Status and copy sent. Other:         Care Management Interventions  PCP Verified by CM:  Yes  Palliative Care Criteria Met (RRAT>21 & CHF Dx)?: No  Mode of Transport at Discharge: [facility] Transport Time of Discharge: Markus San (CM Consult): SNF  Partner SNF: Yes  Discharge Durable Medical Equipment: No  Physical Therapy Consult: Yes  Occupational Therapy Consult: Yes  Speech Therapy Consult: Yes  Support Systems: Spouse/Significant Other  Confirm Follow Up Transport: Family  The Patient and/or Patient Representative was Provided with a Choice of Provider and Agrees with the Discharge Plan?: Yes  Name of the Patient Representative Who was Provided with a Choice of Provider and Agrees with the Discharge Plan: Hannah Alcaraz  [place] of Choice List was Provided with Basic Dialogue that Supports the Patient's Individualized Plan of Care/Goals, Treatment Preferences and Shares the Quality Data Associated with the Providers?: Yes  [place] Resource Information Provided?: No  Discharge Location  Discharge Placement: Skilled nursing facility    Shell Pradhan, [facility]  [phone]

## 2021-10-18 NOTE — DISCHARGE INSTRUCTIONS
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 Pain Management: per above medications    Recommended diet: Regular Diet    Recommended activity: Activity as tolerated    Wound care: None    Indwelling devices:  None    Supplemental Oxygen: None    Required Lab work: Per SNF routine    Glucose management:  None    Code status: DNR        Outside physician follow up: Follow-up Information     Follow up With Specialties Details Why Contact Info    None    None (395) Patient stated that they have no PCP                 Skilled nursing facility/ SNF MD responsible for above on discharge. Information obtained by :  I understand that if any problems occur once I am at home I am to contact my physician. I understand and acknowledge receipt of the instructions indicated above.                                                                                                                                            Physician's or R.N.'s Signature                                                                  Date/Time                                                                                                                                              Patient or Repres

## 2021-10-18 NOTE — PROGRESS NOTES
Problem: Mobility Impaired (Adult and Pediatric)  Goal: *Acute Goals and Plan of Care (Insert Text)  Description: FUNCTIONAL STATUS PRIOR TO ADMISSION: Patient was independent and without use of DME however with impaired balance/strength with progressive decline since June 2021. Patient was modified independent for basic and instrumental ADLs. HOME SUPPORT: The patient lived with wife who provided assistance. Patients states \"she helped me with everything\" since June requiring some S/min A due to progressive onset of weakness. Physical Therapy Goals  Initiated 10/16/2021  1. Patient will move from supine to sit and sit to supine , scoot up and down, and roll side to side in bed with modified independence within 7 day(s). 2.  Patient will transfer from bed to chair and chair to bed with modified independence using the least restrictive device within 7 day(s). 3.  Patient will perform sit to stand with modified independence within 7 day(s). 4.  Patient will ambulate with modified independence for 150 feet with the least restrictive device within 7 day(s). 5.  Patient will ascend/descend 4 stairs without handrail(s) with minimal assistance/contact guard assist within 7 day(s). Outcome: Progressing Towards Goal   PHYSICAL THERAPY TREATMENT  Patient: Paul Ballesteros (89 y.o. male)  Date: 10/18/2021  Diagnosis: Generalized weakness [R53.1]  Unable to walk [R26.2]  Hyponatremia [E87.1] <principal problem not specified>       Precautions: Fall  Chart, physical therapy assessment, plan of care and goals were reviewed. ASSESSMENT  Patient continues with skilled PT services and is progressing towards goals. Patient overall moving well but has some mild instability with ambulation as well as generalized weakness that limits his activity tolerance. Supervision to come to EOB and CGA for transfers. Amb approx 80 feet with RW and CGA with no overt LOB but some generalized weakness noted.   Instructed in HEP for LE strengthening. Patient prefers to DC to SNF rehab at this time as he does not feel that he can manage at home given his weakness and instability. Will continue to follow. Other factors to consider for discharge: high anxiety regarding mobility, back pain that limits mobility         PLAN :  Patient continues to benefit from skilled intervention to address the above impairments. Continue treatment per established plan of care. to address goals. Recommendation for discharge: (in order for the patient to meet his/her long term goals)  Patient requests Therapy up to 5 days/week in SNF setting. If does not qualify than anticipate he can return home with wife and RW with  PT      IF patient discharges home will need the following DME: rolling walker       SUBJECTIVE:   Patient stated I just don't think I can manage at home like that. We have lots of stairs.     OBJECTIVE DATA SUMMARY:   Critical Behavior:  Neurologic State: Alert  Orientation Level: Oriented X4  Cognition: Appropriate decision making, Appropriate for age attention/concentration, Appropriate safety awareness  Safety/Judgement: Insight into deficits, Decreased awareness of need for safety  Functional Mobility Training:  Bed Mobility:     Supine to Sit: Supervision  Sit to Supine: Supervision  Scooting: Supervision        Transfers:  Sit to Stand: Contact guard assistance  Stand to Sit: Contact guard assistance                             Balance:  Sitting: Intact  Standing: Intact  Standing - Static: Good  Standing - Dynamic : Good  Ambulation/Gait Training:  Distance (ft): 80 Feet (ft)  Assistive Device: Gait belt;Walker, rolling  Ambulation - Level of Assistance: Contact guard assistance        Gait Abnormalities: Decreased step clearance        Base of Support: Widened     Speed/Eri: Pace decreased (<100 feet/min); Shuffled; Slow  Step Length: Left shortened;Right shortened         Therapeutic Exercises:    In sitting:  LAQ  Marching  Hip add  Hip abd   Glut sets  Ankle DF/PF  Pain Rating:  Reports back pain but does not rate    Activity Tolerance:   Good and requires rest breaks    After treatment patient left in no apparent distress:   Supine in bed, Call bell within reach, and Caregiver / family present    COMMUNICATION/COLLABORATION:   The patients plan of care was discussed with: Physical therapist, Occupational therapist, and Registered nurse.      Wing Mcallister, PT, DPT   Time Calculation: 26 mins

## 2021-10-18 NOTE — PROGRESS NOTES
Physician Progress Note      Sera Phillips  CSN #:                  [id number]  :                       1955  ADMIT DATE:       10/15/2021 6:29 AM  [address] [place] DATE:  RESPONDING  PROVIDER #:        Jose Laurent MD          QUERY TEXT:    Dr Matthew Groves  Pt admitted with weakness/tremors. Pt noted to have possible depression/anxiety; SIADH, chemical exposure history. If possible, please document in progress notes and discharge summary the relationship, if any, between weakness/tremors and possible depression/anxiety; SIADH, chemical exposure. The medical record reflects the following:  Risk Factors: presents with weakness, atrophy, history of photo developing chemical exposure  Clinical Indicators: weakness, Na 131-125, decreased activity tolerance with self-limiting behavior; Serum osmolarity low ,urine osmolarity high, urine sodium is high, serum uric acid is low,  Treatment: Tolvaptan (1 time dose); Lexapro, extensive neuro workup; Options provided:  -- Weakness/Tremors due to possible depression/anxiety; SIADH, chemical exposure history  -- Weakness/Tremors  due to depression/anxiety  -- Weakness/Tremors  due to Hx of photo developing chemical exposure  -- Weakness/Tremors due to possible SIADH/hyponatemia  -- Other - I will add my own diagnosis  -- Disagree - Not applicable / Not valid  -- Disagree - Clinically unable to determine / Unknown  -- Refer to Clinical Documentation Reviewer    PROVIDER RESPONSE TEXT:    This patient has Weakness/Tremors due to possible depression/anxiety; SIADH, chemical exposure history. Query created by:  Don Carvajal on 10/18/2021 8:32 AM      Electronically signed by:  Jose Laurent MD 10/18/2021 2:41 PM

## 2021-10-18 NOTE — PROGRESS NOTES
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 --Evaluated by telemetry neurologist in the ER . There is concern for ischemia or demyelination or potentially neurodegenerative neuromuscular disease. --MRI of the lumbar spine showed  L5-S1: Normal disc height though diminished disc signal. Right lateral annular  tear. Mild diffuse disc bulging, accentuated in the right lateral and foraminal  regions. No substantial facet arthrosis. No canal stenosis. No substantial  subarticular stenosis. Mild bilateral foraminal stenosis. Bari Shaun MRI of the cervical spine showed  . Normal cord signal.  2. C5-6 and C6-7 cervical spondylosis as above. There is mild left anterolateral  cord compression at C5-6. There is left subarticular and foraminal stenosis at  C5-6 and C6-7  Neurology input appreciated, additional work-up done to r/omyasthenia gravis  J87 folic acid and TSH were within normal limit  Most likely a component of anxiety, Lexapro added by neurology  OT recommended outpatient rehab but patient would like to go to inpatient rehab  We will consult   Patient EEG per neurology note    Hyponatremia Na 131, worsening  Concern for SIADH most likely due to stress  --Sodium worsening to 125, nephro consulted, tolvaptan given on 10/17  Repeat BMP with improvement of the sodium up to 132-133  Body mass index is 25.61 kg/m².     Code: full  DVT prophylaxis: lovenox  Surrogate decision maker:  Wife Rianna Mis     25.0 - 29.9 Overweight / Body mass index is 25.61 kg/m².     Estimated discharge date:   Barriers:  Discussed with wife at bedside  Recommended Disposition: HH PT, OT, RN     Subjective: Chief Complaint / Reason for Physician Visit  Follow-up tremors. patient reporting being more awake this morning, slept some yesterday evening but felt sleepy this morning because of the melatonin    Review of Systems:  Symptom Y/N Comments  Symptom Y/N Comments   Fever/Chills    Chest Pain n    Poor Appetite    Edema     Cough    Abdominal Pain n    Sputum    Joint Pain     SOB/DOMINGUEZ n   Pruritis/Rash     Nausea/vomit n   Tolerating PT/OT     Diarrhea    Tolerating Diet y    Constipation    Other       Could NOT obtain due to:      Objective:     VITALS:   Last 24hrs VS reviewed since prior progress note. Most recent are:  Patient Vitals for the past 24 hrs:   Temp Pulse Resp BP SpO2   10/18/21 1154 98.4 °F (36.9 °C) 88 16 (!) 140/71 94 %   10/18/21 0801 98.6 °F (37 °C) 80 16 (!) 153/71 98 %   10/18/21 0330 98 °F (36.7 °C) 75 17 117/68 96 %   10/17/21 2006 98.6 °F (37 °C) 68 18 117/66 96 %   10/17/21 1600  82      10/17/21 1543 98.4 °F (36.9 °C) 83 16 (!) 140/73 98 %     No intake or output data in the 24 hours ending 10/18/21 1300     I had a face to face encounter and independently examined this patient on 10/18/2021, as outlined below:  PHYSICAL EXAM:  General: WD, WN. Alert, cooperative, no acute distress    EENT:  EOMI. Anicteric sclerae. MMM  Resp:  CTA bilaterally, no wheezing or rales. No accessory muscle use  CV:  Regular  rhythm,  No edema  GI:  Soft, Non distended, Non tender. +Bowel sounds  Neurologic:  Alert and oriented X 3, normal speech,   Psych:   Good insight. Not anxious nor agitated  Skin:  No rashes.   No jaundice    Reviewed most current lab test results and cultures  YES  Reviewed most current radiology test results   YES  Review and summation of old records today    NO  Reviewed patient's current orders and MAR    YES  PMH/SH reviewed - no change compared to H&P  ________________________________________________________________________  Care Plan discussed with:    Comments   Patient x    Family  x  wife   RN x    Care Manager     Consultant  x                       Multidiciplinary team rounds were held today with , nursing, pharmacist and clinical coordinator. Patient's plan of care was discussed; medications were reviewed and discharge planning was addressed. ________________________________________________________________________  Total NON critical care TIME: 35Minutes    Total CRITICAL CARE TIME Spent:   Minutes non procedure based      Comments   >50% of visit spent in counseling and coordination of care     ________________________________________________________________________  Leandro Mcnamara MD     Procedures: see electronic medical records for all procedures/Xrays and details which were not copied into this note but were reviewed prior to creation of Plan. LABS:  I reviewed today's most current labs and imaging studies. Pertinent labs include:  No results for input(s): WBC, HGB, HCT, PLT, HGBEXT, HCTEXT, PLTEXT, HGBEXT, HCTEXT, PLTEXT in the last 72 hours. Recent Labs     10/18/21  1049 10/18/21  0129 10/17/21  1643 10/17/21  1022 10/17/21  1022   *  133* 134*  133* 131*   < > 125*   K 4.1 4.1  --   --  4.1    104  --   --  95*   CO2 24 24  --   --  22   * 124*  --   --  141*   BUN 16 16  --   --  10   CREA 0.82 0.84  --   --  0.69*   CA 9.6 9.3  --   --  8.7    < > = values in this interval not displayed.        Signed: Leandro Mcnamara MD

## 2021-10-18 NOTE — PROGRESS NOTES
End of Shift Note    Bedside shift change report given to KATJA Cid (oncoming nurse) by Lata Villalobos RN (offgoing nurse). Report included the following information SBAR, Kardex, Intake/Output and MAR    Shift worked:  night   Shift summary and any significant changes:     uneventful night, patient slept well, decline 2300 vitals sign check to be able to sleep trough. Able to make needs known, call bell and phone within reach of patient.        Concerns for physician to address:  none   Zone phone for oncoming shift:   [phone]     Patient Information  Ramin Cuello  61 y.o.  10/15/2021  6:29 AM by Titi Valles MD. Ramin Cuello was admitted from Home    Problem List  Patient Active Problem List    Diagnosis Date Noted    Tremors of nervous system 10/16/2021    Lumbosacral radiculopathy due to degenerative joint disease of spine 10/16/2021    Cervical radiculopathy due to degenerative joint disease of spine 89/12/6436    Metabolic myopathy 12/00/3299    Chronic insomnia 10/16/2021    Depression 10/16/2021    History of toxic encephalopathy     Weakness generalized     Hyponatremia 10/15/2021    Generalized weakness 10/15/2021    Unable to walk 10/15/2021     Past Medical History:   Diagnosis Date    History of toxic encephalopathy     Weakness generalized        Core Measures:  CVA: Yes Yes    Activity:  Activity Level: Up ad teddy  Number times ambulated in hallways past shift: 0  Number of times OOB to chair past shift: 3    Cardiac:   Cardiac Monitoring: Yes      Cardiac Rhythm: Sinus Rhythm    Access:   Current line(s): PIV     Genitourinary:   Urinary status: voiding    Respiratory:   O2 Device: None (Room air)  Chronic home O2 use?: NO  Incentive spirometer at bedside: NO       GI:  Last Bowel Movement Date: 10/16/21  Current diet:  ADULT DIET Regular  Passing flatus: YES  Tolerating current diet: YES       Pain Management:   Patient states pain is manageable on current regimen: YES    Skin:  Jonathan Score: 20  Interventions: increase time out of bed    Patient Safety:  Fall Score:  Total Score: 3  Interventions: gripper socks and pt to call before getting OOB  High Fall Risk: Yes  @Rollbelt  @dexterity to release roll belt  Yes/No ( must document dexterity  here by stating Yes or No here, otherwise this is a restraint and must follow restraint documentation policy.)    DVT prophylaxis:  DVT prophylaxis Med- No  DVT prophylaxis SCD or YISSEL- No     Wounds: (If Applicable)  Wounds- No  Location     Active Consults:  IP CONSULT TO NEUROLOGY  IP CONSULT TO NEPHROLOGY    Length of Stay:  Expected LOS: - - -  Actual LOS: 3  Discharge Plan: No       Graciela Luis RN

## 2021-10-18 NOTE — PROGRESS NOTES
SPEECH LANGUAGE PATHOLOGY EVALUATION  Patient: Rodrigo Frankel (89 y.o. male)  Date: 10/18/2021  Primary Diagnosis: Generalized weakness [R53.1]  Unable to walk [R26.2]  Hyponatremia [E87.1]       Precautions:        ASSESSMENT :  Based on the objective data described below, the patient presents with weak voice, unable to increase volume. Speech was fully intelligible. Basic language intact. Swallowing not problematic by report. Patient will benefit from skilled intervention to address the above impairments. Patients rehabilitation potential is considered to be Good     PLAN :  Recommendations and Planned Interventions: We will follow with you. Awaiting MG panel. Frequency/Duration: Patient will be followed by speech-language pathology 2 times a week to address goals. Discharge Recommendations: None     SUBJECTIVE:   Patient stated he was trying to conserve his voice due to it being weak. OBJECTIVE:     Past Medical History:   Diagnosis Date    History of toxic encephalopathy     Weakness generalized    History reviewed. No pertinent surgical history.   Prior Level of Function/Home Situation:   Home Situation  Home Environment: Private residence  # Steps to Enter: 4  Rails to Enter: No  One/Two Story Residence: Two story, live on 1st floor (BR on 1st floor)  Living Alone: No  Support Systems: Spouse/Significant Other  Patient Expects to be Discharged to[de-identified] House  Current DME Used/Available at Home: Cane, straight (1 fall from rug slip)  Tub or Shower Type: Shower (hasn't been showering)  Mental Status:  Neurologic State: Alert  Orientation Level: Oriented X4  Cognition: Appropriate decision making, Appropriate for age attention/concentration, Appropriate safety awareness  Perception: Appears intact  Perseveration: No perseveration noted  Safety/Judgement: Insight into deficits, Decreased awareness of need for safety  Motor Speech:  Oral-Motor Structure/Motor Speech  Labial: No impairment  Dentition: Natural;Full  Lingual: No impairment  Velum: No impairment  Mandible: No impairment  Apraxic Characteristics: None  Intelligibility: No impairment  Overall Impairment Severity: None  Language Comprehension and Expression:  Auditory Comprehension  Auditory Impairment: No   Verbal Expression  Primary Mode of Expression: Verbal  Initiation: No impairment  Conversation: Fluent  Overall Impairment: Minimal        Pragmatics:   wnl     Voice:                 Vocal Quality: No impairment   weak voice                              NOMS: motor speech 6    Pain:  Pain Scale 1: Numeric (0 - 10)  Pain Intensity 1: 0  Pain Location 1: Back    After treatment:   Patient left in no apparent distress in bed    COMMUNICATION/EDUCATION:   Patient educated that we will be awaiting MG panel. The patient's plan of care including recommendations, planned interventions, and recommended diet changes were discussed with: Registered nurse. Patient/family have participated as able in goal setting and plan of care.     Thank you for this referral.  Yessica Tinajero, SLP

## 2021-10-18 NOTE — PROGRESS NOTES
Report called to [facility], patient being picked up at [facility] by [facility] for transport to [facility]. Rapid covid completed and sent.

## 2021-10-18 NOTE — PROGRESS NOTES
Problem: Self Care Deficits Care Plan (Adult)  Goal: *Acute Goals and Plan of Care (Insert Text)  Description:   FUNCTIONAL STATUS PRIOR TO ADMISSION: Patient was independent and active without use of DME however with impaired balance/strength with progressive decline since June 2021. Patient was modified independent for basic and instrumental ADLs. HOME SUPPORT: The patient lived with wife but did not require assist. Since June requiring some S/min A from wife due to progressive onset of weakness. Occupational Therapy Goals  Initiated 10/16/2021  1. Patient will perform grooming standing with modified independence within 7 day(s). 2.  Patient will perform lower body dressing with modified independence within 7 day(s). 3.  Patient will perform bathing with modified independence within 7 day(s). 4.  Patient will perform toilet transfers with modified independence within 7 day(s). 5.  Patient will perform all aspects of toileting with modified independence within 7 day(s). 6.  Patient will participate in upper extremity therapeutic exercise/activities with supervision/set-up for 15 minutes within 7 day(s). 7.  Patient will utilize energy conservation techniques during functional activities with verbal cues within 7 day(s). Outcome: Progressing Towards Goal    OCCUPATIONAL THERAPY TREATMENT  Patient: Miguelina Rivera (84 y.o. male)  Date: 10/18/2021  Diagnosis: Generalized weakness [R53.1]  Unable to walk [R26.2]  Hyponatremia [E87.1] <principal problem not specified>       Precautions:    Chart, occupational therapy assessment, plan of care, and goals were reviewed. ASSESSMENT  Patient continues with skilled OT services and is progressing towards goals. Pt rc'd supine in bed agreeable to session. Pt is functioning at overall SPV level however declined to complete LB dressing and toileting during session and declined transfer to chair.  Pts wife initally present however leaving at start of session after attempting to encouarge pt to trial recliner chair. Pt competed bedroom and hallway mobility using RW with CGA-SPV, no LOB noted and pt endorsing fatigue following. Pt tolerated UE therex while seated EOB with no LOB to preserve should mobility and improve BUE strength, endorsing some slight discomfort in back. Pt with self limiting behavior, endorsed anxiety, and demo'd inconsistent performance throughout session. During session pt reports he desires to continue rehab in SNF setting prior to returning home to further address LE weakness. Current Level of Function Impacting Discharge (ADLs): SPV - MIN A    Other factors to consider for discharge: pt lives with wife, wants to go to SNF         PLAN :  Patient continues to benefit from skilled intervention to address the above impairments. Continue treatment per established plan of care to address goals. Recommend with staff: Seated EOB for meals    Recommend next OT session: standing grooming, toileting     Recommendation for discharge: (in order for the patient to meet his/her long term goals)  To be determined: Per pts request - pt would like to DC to SNF prior to returning home  vs Home with [address] [place] [address] Avenue and assist from wife     This discharge recommendation:  A follow-up discussion with the attending provider and/or case management is planned    IF patient discharges home will need the following DME: Rolling Walker        SUBJECTIVE:   Patient stated I cant go home like this .     OBJECTIVE DATA SUMMARY:   Cognitive/Behavioral Status:     Orientation Level: Oriented X4  Cognition: Appropriate decision making; Appropriate for age attention/concentration; Appropriate safety awareness  Perception: Appears intact  Perseveration: No perseveration noted       Functional Mobility and Transfers for ADLs:  Bed Mobility:  Supine to Sit: Supervision  Sit to Supine: Supervision  Scooting: Supervision    Transfers:  Sit to Stand: Contact guard assistance Balance:       ADL Intervention:                           Lower Body Dressing Assistance  Socks: Supervision              Therapeutic Exercises:     EXERCISE   Sets   Active Active Assist   Passive   Comments   Shoulder ant flexion 1 [x]           []           []              Shoulder ant flexion at 45* angle  1 [x]           []           []              Shoulder abduction  1 [x]           []           []              Shoulder shrugs 1 [x]        Scapular retraction 1 [x]             Pain:  Pt endorsing back pain during session intermittently. Pt did not quantify pain level     Activity Tolerance:   Good    After treatment patient left in no apparent distress:   Supine in bed, Call bell within reach, Side rails x 3, and IBANEZ with lunch tray     COMMUNICATION/COLLABORATION:   The patients plan of care was discussed with: Physical therapist, Occupational therapist, and Registered nurse.      Aleyda Barbour OT  Time Calculation: 23 mins

## 2021-10-18 NOTE — DISCHARGE SUMMARY
Hospitalist Discharge Summary     Patient ID:  Sharlett Koyanagi  [id number]  89 y.o.  1955  10/15/2021    PCP on record: None    Admit date: 10/15/2021  Discharge date and time: 10/18/2021    DISCHARGE DIAGNOSIS:  Tremors, with reported intermittent shaking in arms and legs  Generalized weakness, POA legs > arms causing inability of walk  Hx of photo developing chemical exposure  Hyponatremia Na 131, worsening  Concern for SIADH most likely due to stress    CONSULTATIONS:  IP CONSULT TO NEUROLOGY  IP CONSULT TO NEPHROLOGY    Excerpted HPI from H&P of Madan Omer MD:  CHIEF COMPLAINT:  Tremors, generalized weakness, unable to walk     HISTORY OF PRESENT ILLNESS:     Sharlett Koyanagi is a 61 y.o. male presents to ER with above symptoms. He and his wife moved from [address] [place] [address],[address] to the [facility] area in August 2020. He lives in a two-story home and baseline ambulate independently. He reports disability due to shoulder developing chemical exposure that led him to have tremors, severe generalized weakness and profound weight loss down to 90 pounds in the late 1990s. 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 After this he gradually recovered and had been doing well up until around June 2021 when he started having some fatigue and generalized weakness. He has gained about 10 to 15 pounds despite diet change and exercise. At 3 AM this morning he had violent shaking in his limbs head with jaw clenching happened twice. He denies any urinary or bladder incontinence or tongue biting. In the last 2-3 days his generalized weakness has worsened and he is having difficulty walking today. He saw a chiropractor yesterday who did very gentle, massage like manipulation on his spine but he did not feel that his back was the problem of his weakness. Patient developed low back pain after bending down to put on his socks about a week ago.   Dizziness when getting out of bed recently.     We were asked to admit for work up and evaluation of the above problems.      No past medical history on file. No past surgical history on file.    ______________________________________________________________________  DISCHARGE SUMMARY/HOSPITAL COURSE:  for full details see H&P, daily progress notes, labs, consult notes. Tremors, with reported intermittent shaking in arms and legs  Generalized weakness, POA legs > arms causing inability of walk  Hx of photo developing chemical exposure causing generalized weakness, tremors which resolved after forced half-way and disability. --Evaluated by telemetry neurologist in the ER . Becky Meseret is concern for ischemia or demyelination or potentially neurodegenerative neuromuscular disease. --MRI of the lumbar spine showed  L5-S1: Normal disc height though diminished disc signal. Right lateral annular  tear. Mild diffuse disc bulging, accentuated in the right lateral and foraminal  regions. No substantial facet arthrosis. No canal stenosis. No substantial  subarticular stenosis. Mild bilateral foraminal stenosis. [facility] [place] MRI of the cervical spine showed  . Normal cord signal.  2. C5-6 and C6-7 cervical spondylosis as above. There is mild left anterolateral  cord compression at C5-6. There is left subarticular and foraminal stenosis at  C5-6 and C6-7  Neurology input appreciated, additional work-up done to r/omyasthenia gravis  V08 folic acid and TSH were within normal limit  Most likely a component of anxiety, Lexapro added by neurology  OT recommended outpatient rehab but patient would like to go to inpatient rehab.   Accepted at the SNF  Patient need outpatient EEG     Hyponatremia Na 131, worsening  Concern for SIADH most likely due to stress  --Sodium worsening to 125, nephro consulted, tolvaptan given on 10/17  Repeat BMP with improvement of the sodium up to 132-133  _______________________________________________________________________  Patient seen and examined by me on discharge day. Pertinent Findings:  Gen:    Not in distress  Chest: Clear lungs  CVS:   Regular rhythm. No edema  Abd:  Soft, not distended, not tender  Neuro:  Alert, oriented x3  _______________________________________________________________________  DISCHARGE MEDICATIONS:   Current Discharge Medication List      START taking these medications    Details   escitalopram oxalate (LEXAPRO) 5 mg tablet Take 1 Tablet by mouth daily (with dinner) for 30 days. Qty: 30 Tablet, Refills: 1  Start date: 10/18/2021, End date: 11/17/2021               Patient Follow Up Instructions:    Activity: Activity as tolerated  Diet: Regular Diet  Wound Care: None needed        Follow-up Information     Follow up With Specialties Details Why Contact Info    None    None ([phone]) Patient stated that they have no PCP          ________________________________________________________________    Risk of deterioration: Moderate    Condition at Discharge:  Stable  __________________________________________________________________    Disposition  SNF/LTC    ____________________________________________________________________    Code Status: DNR___________________________________________________________________      Total time in minutes spent coordinating this discharge (includes going over instructions, follow-up, prescriptions, and preparing report for sign off to her PCP) :  >30 minutes    Signed:  Janet Lopez MD

## 2021-10-18 NOTE — PROGRESS NOTES
Pt picked up by [facility] to transport to [address] [place] [address]. Wife here and took all belongings.

## 2021-10-19 LAB
IGA SERPL-MCNC: 450 MG/DL (ref 61–437)
IGG SERPL-MCNC: 903 MG/DL (ref 603–1613)
IGM SERPL-MCNC: 52 MG/DL (ref 20–172)
PROT PATTERN SERPL IFE-IMP: ABNORMAL

## 2021-10-19 NOTE — PROGRESS NOTES
Consult  REFERRED BY:  None    CHIEF COMPLAINT: Generalized weakness, tremors      Subjective:     Rafael Abad is a 61 y.o. 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 Since June he has had increased difficulty with generalized fatigue and weakness, and 10 to 15 pound weight loss, cannot sleep at night, anxious all the time, and looks very anxious and depressed. Patient had MRI of his lumbar spine that shows mild degenerative arthritis and MRI of the cervical spine that shows the same thing, and MRI of the brain was unremarkable. Patient's CPK levels were normal, all of his other blood work was normal including B12, and thyroid etc.  And the patient just looks poorly conditioned and generally weak. He does not have any tremors at all now but is slightly hyperreflexic in appears to be gets very tense anxious and depressed. If no fasciculations and no focal atrophy of his muscles, but his muscle bulk and tone is decreased throughout. No trouble with his cranial nerves, he did not appear to have any major limitation of his cervical or lumbar spine. His laboratory studies are otherwise unremarkable except for borderline elevated blood sugar. He was taking no medications prior to admission. Patient found to have a slightly low sodium of 129, which is probably clinically insignificant, but minimal see him today, to further evaluate that. All his other blood studies and neuromuscular studies are normal so far. His MRI scans do not show any clear recommendation to explain his progress.   I discussed his condition with the patient and his wife in detail, and they wanted inpatient rehab but he is already been screened for that and not a candidate, so we will need outpatient therapy to have a  talk to her because she wants to put them in a skilled nursing facility years she has a pay for herself. Patient to go to skilled nursing facility to get more intensive rehab, and I talked to the wife and the patient in detail explained that his sodium is back to normal, and all his tests are normal, he said he even feels we continued him he got here, but he can walk about without too much difficulty, and seems depressed. I told the wife to call our office when to get in with his rehab he still has weakness we can then do an EMG and nerve conduction study on him and follow him that way. She said she would      Past Medical History:   Diagnosis Date    History of toxic encephalopathy     Weakness generalized       History reviewed. No pertinent surgical history. Family History   Problem Relation Age of Onset    Arthritis-rheumatoid Father     Diabetes Brother       Social History     Tobacco Use    Smoking status: Never Smoker    Smokeless tobacco: Never Used   Substance Use Topics    Alcohol use: Not Currently         Current Facility-Administered Medications:     melatonin tablet 1.5 mg, 1.5 mg, Oral, QHS, Mario Leonardo MD    escitalopram oxalate (LEXAPRO) tablet 5 mg, 5 mg, Oral, DAILY WITH DINNER, Carlos Veliz MD, 5 mg at 10/17/21 1601    enoxaparin (LOVENOX) injection 40 mg, 40 mg, SubCUTAneous, Q24H, Gurjit Chou MD, 40 mg at 10/17/21 1602    LORazepam (ATIVAN) injection 1 mg, 1 mg, IntraVENous, Q6H PRN, Gurjit Chou MD    acetaminophen (TYLENOL) tablet 650 mg, 650 mg, Oral, Q4H PRN, 650 mg at 10/18/21 1118 **OR** acetaminophen (TYLENOL) solution 650 mg, 650 mg, Per NG tube, Q4H PRN **OR** acetaminophen (TYLENOL) suppository 650 mg, 650 mg, Rectal, Q4H PRN, Pamela Victor NP    Current Outpatient Medications:     escitalopram oxalate (LEXAPRO) 5 mg tablet, Take 1 Tablet by mouth daily (with dinner) for 30 days. , Disp: 30 Tablet, Rfl: 1        No Known Allergies   MRI Results (most recent):  Results from Hospital Encounter encounter on 10/15/21    MRI LUMB SPINE W WO CONT    Narrative  EXAM: MRI LUMB SPINE W WO CONT    INDICATION: low back pain, unable to walk, b/l arm and leg weakness. COMPARISON: None    TECHNIQUE: MR imaging of the lumbar spine was performed using the following  sequences: sagittal T1, T2, STIR;  axial T1, T2 prior to and following contrast  administration. CONTRAST: 13 mL of ProHance. FINDINGS:    Conus position, morphology, signal and enhancement appear normal. No intraspinal  or paraspinal soft tissue mass or enhancement abnormality is shown. There is 4 mm anterolisthesis at L5-S1. Alignment is otherwise anatomic. Moderate right and mild left SI joint osteoarthrosis is noted in the partly  visualized superior sacrum and SI joints. Vertebral body heights are normal. Numerous hemangiomas are noted in the lumbar  and lower thoracic spine including a large hemangioma replacing the L2 vertebral  body. Bone signal is otherwise normal.    Lower thoracic spine: No herniation or stenosis. L1-L2: No herniation or stenosis. L2-L3: No herniation or stenosis. L3-L4: No herniation or stenosis. L4-L5: No herniation or stenosis. L5-S1: Normal disc height though diminished disc signal. Right lateral annular  tear. Mild diffuse disc bulging, accentuated in the right lateral and foraminal  regions. No substantial facet arthrosis. No canal stenosis. No substantial  subarticular stenosis. Mild bilateral foraminal stenosis. .    Impression  L5-S1 spondylosis as above. Normal-appearing distal cord and conus. Multiple hemangiomas with diffuse vertebral body replacement and L2. Results from [place] encounter on 10/15/21    MRI LUMB SPINE W WO CONT    Narrative  EXAM: MRI LUMB SPINE W WO CONT    INDICATION: low back pain, unable to walk, b/l arm and leg weakness.     COMPARISON: None    TECHNIQUE: MR imaging of the lumbar spine was performed using the following  sequences: sagittal T1, T2, STIR;  axial T1, T2 prior to and following contrast  administration. CONTRAST: 13 mL of ProHance. FINDINGS:    Conus position, morphology, signal and enhancement appear normal. No intraspinal  or paraspinal soft tissue mass or enhancement abnormality is shown. There is 4 mm anterolisthesis at L5-S1. Alignment is otherwise anatomic. Moderate right and mild left SI joint osteoarthrosis is noted in the partly  visualized superior sacrum and SI joints. Vertebral body heights are normal. Numerous hemangiomas are noted in the lumbar  and lower thoracic spine including a large hemangioma replacing the L2 vertebral  body. Bone signal is otherwise normal.    Lower thoracic spine: No herniation or stenosis. L1-L2: No herniation or stenosis. L2-L3: No herniation or stenosis. L3-L4: No herniation or stenosis. L4-L5: No herniation or stenosis. L5-S1: Normal disc height though diminished disc signal. Right lateral annular  tear. Mild diffuse disc bulging, accentuated in the right lateral and foraminal  regions. No substantial facet arthrosis. No canal stenosis. No substantial  subarticular stenosis. Mild bilateral foraminal stenosis. .    Impression  L5-S1 spondylosis as above. Normal-appearing distal cord and conus. Multiple hemangiomas with diffuse vertebral body replacement and L2.     Review of Systems:  A comprehensive review of systems was negative except for: Constitutional: positive for fatigue and malaise  Musculoskeletal: positive for myalgias, arthralgias, stiff joints, neck pain, back pain and muscle weakness  Neurological: positive for weakness  Behvioral/Psych: positive for anxiety and depression   Vitals:    10/18/21 0330 10/18/21 0801 10/18/21 1154 10/18/21 1523   BP: 117/68 (!) 153/71 (!) 140/71 (!) 146/78   Pulse: 75 80 88 84   Resp: 17 16 16 16   Temp: 98 °F (36.7 °C) 98.6 °F (37 °C) 98.4 °F (36.9 °C) 98.6 °F (37 °C)   SpO2: 96% 98% 94% 96%   Weight:       Height:         Objective:     I      NEUROLOGICAL EXAM:    Appearance: The patient is poorly developed and nourished, provides a coherent history and is in no acute distress. Mental Status: Oriented to time, place and person, and the president, cognitive function is normal and speech is fluent and no aphasia or dysarthria. Mood and affect appropriate very depressed and anxious. Cranial Nerves:   Intact visual fields. Fundi are benign, disc are flat, no lesions seen on funduscopy. JESUSITA, EOM's full, no nystagmus, no ptosis. Facial sensation is normal. Corneal reflexes are not tested. Facial movement is symmetric. Hearing is normal bilaterally. Palate is midline with normal sternocleidomastoid and trapezius muscles are normal. Tongue is midline. Neck without meningismus or bruits  Temporal arteries are not tender or enlarged  TMJ areas are not tender on palpation   Motor:  4/5 strength in upper and lower proximal and distal muscles. Normal bulk and tone. No fasciculations. Rapid alternating movement is symmetric and intact bilaterally   Reflexes:   Deep tendon reflexes 2+/4 and symmetrical.  No babinski or clonus present   Sensory:   Normal to touch, pinprick and vibration and temperature. DSS is intact   Gait:  Abnormal gait for patient's age as he is generally weak and probably needs a cane or walker due to his poor conditioning. Tremor:   No tremor noted. Cerebellar:  No abnormal cerebellar signs present on Romberg and tandem testing and finger-nose-finger exam.   Neurovascular:  Normal heart sounds and regular rhythm, peripheral pulses intact, and no carotid bruits. Assessment:       ICD-10-CM ICD-9-CM    1. Ambulatory dysfunction  R26.2 719.7    2. Weakness  R53.1 780.79    3. Cervical radiculopathy due to degenerative joint disease of spine  M47.22 721.0    4. Generalized weakness  R53.1 780.79    5. History of toxic encephalopathy  Z86.69 V12.49    6. Lumbosacral radiculopathy due to degenerative joint disease of spine  M47.27 722.52    7. Metabolic myopathy  D36.7 554.80     G73.7     8. Tremors of nervous system  R25.1 781.0    9. Weakness generalized  R53.1 780.79      Active Problems:    Hyponatremia (10/15/2021)      Generalized weakness (10/15/2021)      Unable to walk (10/15/2021)      History of toxic encephalopathy ()      Weakness generalized ()      Tremors of nervous system (10/16/2021)      Lumbosacral radiculopathy due to degenerative joint disease of spine (10/16/2021)      Cervical radiculopathy due to degenerative joint disease of spine (15/84/9417)      Metabolic myopathy (16/39/1736)      Chronic insomnia (10/16/2021)      Depression (10/16/2021)        Plan:     Patient with normal MRI scans except for minimal arthritis of the cervical and lumbar spine and a normal MRI of the brain for his age. All his lab studies are relatively normal including CPK levels, thyroid, and B12 levels, and the rest of his labs, and he should be able to go home with some outpatient therapy and generalized conditioning would probably benefit from a mild antidepressant to help him sleep at night so we will start some Lexapro. He can follow-up in the office as an outpatient EEG for his tremors, but they are gone now probably more anxiety related, and he can follow-up in our office for possible EMG studies if his sensation of weakness persists. Patient found to have a slightly low sodium of 129, which is probably clinically insignificant, but minimal see him today, to further evaluate that. All his other blood studies and neuromuscular studies are normal so far. His MRI scans do not show any clear recommendation to explain his progress.   I discussed his condition with the patient and his wife in detail, and they wanted inpatient rehab but he is already been screened for that and not a candidate, so we will need outpatient therapy to have a  talk to her because she wants to put them in a skilled nursing facility years she has a pay for herself. Patient to go to skilled nursing facility to get more intensive rehab, and I talked to the wife and the patient in detail explained that his sodium is back to normal, and all his tests are normal, he said he even feels we continued him he got here, but he can walk about without too much difficulty, and seems depressed. I told the wife to call our office when to get in with his rehab he still has weakness we can then do an EMG and nerve conduction study on him and follow him that way. She said she would  He may need psychiatric referral.  Continue excellent medical care as you are, we will follow in the office and consider EMG study if he remains weak.     Signed By: Lashaun Henson MD     October 18, 2021       CC: None  FAX: None

## 2021-10-27 ENCOUNTER — PATIENT OUTREACH (OUTPATIENT)
Dept: CASE MANAGEMENT | Age: 66
End: 2021-10-27

## 2021-10-29 NOTE — PROGRESS NOTES
[address] [place] [address] Dr Discharge Note    *The information contained in this note was received during a weekly care coordination call with the post acute facility*      Patient was discharged from [address] [place] [address] ([facility]) on 10/19/2021. Patient left AMA. PCP: None  [facility] appointment: No future appointments. Home Health/Outpatient orders at discharge: refused  [address] ordered at discharge: None  [address] received prior to discharge: Valentina Team will sign off at this time. Medications were not reconciled and general patient assessment was not completed during this skilled nursing facility outreach.      JESSICA Stinson  [facility] Team

## 2021-11-04 LAB
ACHR AB SER-SCNC: <0.03 NMOL/L (ref 0–0.24)
ACHR BLOCK AB SER-ACNC: 18 % (ref 0–25)
ACHR MOD AB/ACHR TOTAL SFR SER: NORMAL %
MGP REFLEX INFO, MGPRT: NORMAL
STRIA MUS AB TITR SER IF: NEGATIVE {TITER}

## 2021-11-05 LAB
INTERPRETATION, NEU2LT: NORMAL
METHOD, NEU3LT: NORMAL
NEURONAL CELL AB, NEU1LT: 5 UNITS (ref 0–54)

## 2021-12-07 LAB
Lab: NORMAL
REFERENCE LAB,REFLB: NORMAL
TEST DESCRIPTION:,ATST: NORMAL

## 2023-05-08 NOTE — PROGRESS NOTES
Nephrology Progress Note  [facility]     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   Generalized weakness    PLAN-   No more samsca   Follow bmp      Subjective: Interval History:   -  Na improved to 134  S/p samsca  bp high this ma      Objective:   Vitals:    10/17/21 1600 10/17/21 2006 10/18/21 0330 10/18/21 0801   BP:  117/66 117/68 (!) 153/71   Pulse: 82 68 75 80   Resp:  18 17 16   Temp:  98.6 °F (37 °C) 98 °F (36.7 °C) 98.6 °F (37 °C)   SpO2:  96% 96% 98%   Weight:       Height:          10/17 0701 - 10/18 0700  In: 150 [P.O.:150]  Out: -   Last 3 Recorded Weights in this Encounter    10/15/21 0631   Weight: 63.5 kg (140 lb)      Physical exam:   GEN: NAD  NECK- Supple, no mass  RESP: No wheezing, Clear b/l  CVS: S1,S2  RRR  NEURO: Normal speech, Non focal  EXT: No Edema   PSYCH: Normal Mood    Chart reviewed. Pertinent Notes reviewed. Data Review :  Recent Labs     10/18/21  0129 10/17/21  1643 10/17/21  1022 10/16/21  1126 10/16/21  1126   *  133* 131* 125*   < > 125*   K 4.1  --  4.1  --  4.1     --  95*  --  96*   CO2 24  --  22  --  24   BUN 16  --  10  --  14   CREA 0.84  --  0.69*  --  0.63*   *  --  141*  --  105*   CA 9.3  --  8.7  --  9.1   URICA  --   --  2.6*  --   --     < > = values in this interval not displayed. No results for input(s): WBC, HGB, HCT, PLT, HGBEXT, HCTEXT, PLTEXT in the last 72 hours. No results for input(s): FE, TIBC, PSAT, FERR in the last 72 hours.    Medication list  reviewed  Current Facility-Administered Medications   Medication Dose Route Frequency    melatonin tablet 3 mg  3 mg Oral DAILY    escitalopram oxalate (LEXAPRO) tablet 5 mg  5 mg Oral DAILY WITH DINNER    enoxaparin (LOVENOX) injection Physical Therapy Visit    Visit Type: Daily Treatment Note  Visit: 12  Referring Provider: Mohinder Cotto MD  Next referring provider visit: 6/26/2023  Medical Diagnosis (from order): Diagnosis Information    Diagnosis  V43.65 (ICD-9-CM) - Z96.652 (ICD-10-CM) - S/P total knee arthroplasty, left         SUBJECTIVE                                                                                                               Pt reports her back pain has improved this date.  Pt notes she did some walking over the weekend and that seemed to help her back pain improve.  Pt states she still has some back pain but it is not as deep and as achy today, as it was last week.  Pt reports her left knee pain is worse than her back pan this date.      Pt notes her knee doesn't seem to be bending as much as she would like, but she did not push her activities over the weekend, as PT recommended.      Pain / Symptoms  - Pain rating (out of 10): Current: 1 (in left knee is achy and stiff)       OBJECTIVE                                                                                                                                       Treatment     Therapeutic Exercise  AROM before tx: 0-104 degrees   AROM after tx: 0-115 degrees    PROM during tx: 0-113 degrees     Bike x 6 min (seat 4, then lowered to seat 3) - full retro and fow revs at each seat height    Knee flexion PROM with pt's leg on PT shoulder - working into more flexion each rep    Manual Therapy   Soft tissue mobilization to left anterior knee complex with extensive work to mobilize quadriceps muscle this date.  Pt was in supine position and PT using superficial clearing and sustained holds.      Skilled input: verbal instruction/cues and as detailed above    Writer verbally educated and received verbal consent for hand placement, positioning of patient, and techniques to be performed today from patient for clothing adjustments for techniques, therapist position for  40 mg  40 mg SubCUTAneous Q24H    LORazepam (ATIVAN) injection 1 mg  1 mg IntraVENous Q6H PRN    acetaminophen (TYLENOL) tablet 650 mg  650 mg Oral Q4H PRN    Or    acetaminophen (TYLENOL) solution 650 mg  650 mg Per NG tube Q4H PRN    Or    acetaminophen (TYLENOL) suppository 650 mg  650 mg Rectal Q4H PRN          Mirtha Nicole MD              [facility]  [facility]  Gabby Lazo  [place], [address]  Phone - [phone]               Fax - [phone] techniques and hand placement and palpation for techniques as described above and how they are pertinent to the patient's plan of care.    Home Exercise Program  Access Code: [email or web address]  URL: [email or web address]  Date: 03/24/2023  Prepared by: Ariane Mazariegos    Exercises  - Supine Quadricep Sets  - 2-3 x daily - 1 sets - 10 reps  - Supine Heel Slide with Strap  - 2-3 x daily - 1 sets - 10 reps  - Small Range Straight Leg Raise  - 2-3 x daily - 1 sets - 10 reps  - Supine Ankle Pumps  - 2-3 x daily - 1 sets - 10 reps    Seated AAROM in chair or on significant other's shoulder for ROM at home  Bike for AAROM (partial revs)      ASSESSMENT                                                                                                            Pt vilma tx well.  Pt demo improving back pain reports this date but did have tighter structures and tighter ROM.  Overall, pt progressing well and tolerating all PT activities very well.  Pt would benefit from cont skilled PT services to address deficits with tissue mobility, ROM, strength and balance.  Improvement in these areas should result in increased function, achievement of LTG's, and return to prior level of function.       Pain/symptoms after session (out of 10): 1  Education:   - Results of above outlined education: Verbalizes understanding and Demonstrates understanding    PLAN                                                                                                                           Suggestions for next session as indicated: Progress per plan of care with manual work and ROM activities.         Therapy procedure time and total treatment time can be found documented on the Time Entry flowsheet